# Patient Record
Sex: FEMALE | Race: WHITE | Employment: FULL TIME | URBAN - METROPOLITAN AREA
[De-identification: names, ages, dates, MRNs, and addresses within clinical notes are randomized per-mention and may not be internally consistent; named-entity substitution may affect disease eponyms.]

---

## 2020-01-14 ENCOUNTER — OFFICE VISIT (OUTPATIENT)
Dept: OBGYN CLINIC | Age: 60
End: 2020-01-14
Payer: COMMERCIAL

## 2020-01-14 ENCOUNTER — HOSPITAL ENCOUNTER (OUTPATIENT)
Age: 60
Setting detail: SPECIMEN
Discharge: HOME | End: 2020-01-14
Payer: COMMERCIAL

## 2020-01-14 VITALS
SYSTOLIC BLOOD PRESSURE: 120 MMHG | DIASTOLIC BLOOD PRESSURE: 84 MMHG | WEIGHT: 183 LBS | HEART RATE: 66 BPM | HEIGHT: 62 IN | BODY MASS INDEX: 33.68 KG/M2

## 2020-01-14 PROBLEM — N81.3 PROCIDENTIA OF UTERUS: Status: ACTIVE | Noted: 2020-01-14

## 2020-01-14 PROBLEM — N81.10 BADEN-WALKER GRADE 2 CYSTOCELE: Status: ACTIVE | Noted: 2020-01-14

## 2020-01-14 PROBLEM — N39.490 OVERFLOW INCONTINENCE: Status: ACTIVE | Noted: 2020-01-14

## 2020-01-14 PROCEDURE — 87624 HPV HI-RISK TYP POOLED RSLT: CPT

## 2020-01-14 PROCEDURE — 99386 PREV VISIT NEW AGE 40-64: CPT | Performed by: OBSTETRICS & GYNECOLOGY

## 2020-01-14 PROCEDURE — G0145 SCR C/V CYTO,THINLAYER,RESCR: HCPCS

## 2020-01-14 SDOH — HEALTH STABILITY: MENTAL HEALTH: HOW OFTEN DO YOU HAVE A DRINK CONTAINING ALCOHOL?: MONTHLY OR LESS

## 2020-01-14 SDOH — HEALTH STABILITY: MENTAL HEALTH: HOW MANY STANDARD DRINKS CONTAINING ALCOHOL DO YOU HAVE ON A TYPICAL DAY?: 1 OR 2

## 2020-01-14 NOTE — PATIENT INSTRUCTIONS
may damage the muscles and other support tissues in your pelvis. Or the muscles and tissues may get weaker as you age. These can make the bladder sag. This may cause uncomfortable pressure in your vagina. A cystocele usually does not cause serious health problems. You may find relief by making lifestyle changes and doing exercises to make the pelvic muscles stronger. Talk to your doctor about steps you can take to reduce symptoms. If your symptoms go on, you may want to talk to your doctor about surgery. Follow-up care is a key part of your treatment and safety. Be sure to make and go to all appointments, and call your doctor if you are having problems. It's also a good idea to know your test results and keep a list of the medicines you take. How can you care for yourself at home? · Do not lift heavy objects or do anything that puts pressure on your pelvic muscles. · Do pelvic floor (Kegel) exercises. These tighten and strengthen pelvic muscles. ? Squeeze the same muscles you would use to stop your urine. Your belly and thighs should not move. ? Hold the squeeze for 3 seconds. Then relax for 3 seconds. ? Start with 3 seconds. Then add 1 second each week until you are able to squeeze for 10 seconds. ? Repeat the exercise 10 to 15 times in each session. Do 3 or more sessions a day. · Lie down and put a pillow under your knees. This eases pressure on your vagina. You also can lie on your side and bring your knees up to your chest.  · Ask your doctor about a vaginal pessary. You can place this in your vagina. It supports the bladder. Your doctor can teach you how and when to remove, clean, and reinsert it. · If your doctor prescribes vaginal estrogen cream, use it exactly as prescribed. When should you call for help? Watch closely for changes in your health, and be sure to contact your doctor if you have any problems. Where can you learn more? Go to [email or web address] and sign in to your MySQL account. Enter S624 in the [facility] box to learn more about \"Cystocele: Care Instructions. \"     If you do not have an account, please click on the \"Sign Up Now\" link. Current as of: February 19, 2019  Content Version: 12.3  © 9064-4922 [employer]. Care instructions adapted under license by [facility] ([facility]). If you have questions about a medical condition or this instruction, always ask your healthcare professional. [address] any warranty or liability for your use of this information. Patient Education        Pessary: Care Instructions  Your Care Instructions    A pessary is a device that fits into your vagina and supports the pelvic organs. It may be used if a pelvic organ sags or moves out of its normal position (prolapse). For some women, wearing a pessary means that they may not have to have surgery to fix a prolapse. A pessary also may help a woman who has trouble controlling her urine (incontinence). Or a pessary may be used during a pregnancy to hold the uterus in place. There are many sizes and types of pessaries. Which type you use depends on the problem you have. Your doctor will make sure the pessary is just right for you. You may need to try different kinds of pessaries to find the best fit. The pessary should hold the pelvic organ in place without causing pain or pressure. You may be able to have sex with your pessary in place. It depends on the type of pessary and your comfort level. Follow-up care is a key part of your treatment and safety. Be sure to make and go to all appointments, and call your doctor if you are having problems. It's also a good idea to know your test results and keep a list of the medicines you take. How can you care for yourself at home? · If you get a vaginal discharge while you have a pessary, talk to your doctor about ways to reduce the discharge and smell.  A pessary, in some cases, rubs the vagina [email or web address] and sign in to your [employer] account. Enter F837 in the [facility] box to learn more about \"Pessary: Care Instructions. \"     If you do not have an account, please click on the \"Sign Up Now\" link. Current as of: February 19, 2019  Content Version: 12.3  © 7341-3049 [employer]. Care instructions adapted under license by [facility] ([facility]). If you have questions about a medical condition or this instruction, always ask your healthcare professional. [address] any warranty or liability for your use of this information. Patient Education        Uterine Prolapse: Care Instructions  Your Care Instructions    When the uterus moves down in the pelvis and starts to press into the vagina, it is called uterine prolapse. This happens when the pelvic muscles and tissues get weak. Women often have this problem after they have children or when they get older. It can also happen if you are overweight or you have a long-term cough. These put extra pressure on the uterus. This problem may cause you to leak urine. Or you may have trouble passing urine or stool. You may feel pain during sex. But in most cases, prolapse doesn't cause more serious health problems. You may feel better if you change how you do some of your daily activities. And you can try exercises to make your pelvic muscles strong. But if these don't help, you may want to talk with your doctor about surgery. Follow-up care is a key part of your treatment and safety. Be sure to make and go to all appointments, and call your doctor if you are having problems. It's also a good idea to know your test results and keep a list of the medicines you take. How can you care for yourself at home? · Do not do activities that put pressure on your pelvic muscles. This includes heavy lifting and straining. · Do exercises to tighten and strengthen your pelvic muscles.  These are called

## 2020-01-14 NOTE — PROGRESS NOTES
History and Physical  [address], [address] [facility] [address] [phone]   Fax [phone]  Chloe Coleman  2020              61 y.o. Chief Complaint   Patient presents with    New Patient       Patient's last menstrual period was 2008. Primary Care Physician: No primary care provider on file. The patient was seen and examined. She has no chief complaint today and is here for her annual exam.  Her bowels are regular. There are no voiding complaints. She denies any bloating. She denies vaginal discharge and was counseled on STD's and the need for barrier contraception. HPI : Chloe Coleman is a 61 y.o. female [id number]    Annual No PMB. Not sexually active. Last pap WNL 15 yrs ago. No abnormal hx. Mammogram and colonoscopy referral. Pt states something protruding out of her vagian  ________________________________________________________________________  OB History    Para Term  AB Living   5 2 2 0 3 2   SAB TAB Ectopic Molar Multiple Live Births   3 0 0 0 0 2      # Outcome Date GA Lbr Bola/2nd Weight Sex Delivery Anes PTL Lv   5 Term 95   7 lb 7 oz (3.374 kg) F Vag-Spont   CLAUDIA   4 SAB 03/15/94           3 Term 86   7 lb 7 oz (3.374 kg) M Vag-Spont   CLAUDIA   2 SAB 10/01/85           1 SAB 09/15/77             No past medical history on file.                                                                 Past Surgical History:   Procedure Laterality Date    CATARACT REMOVAL WITH IMPLANT Bilateral     HERNIA REPAIR  2009    Darryl fundiplication    SKIN GRAFT      TUBAL LIGATION       Family History   Problem Relation Age of Onset    Lung Cancer Father     Hypertension Mother     Hypertension Sister      Social History     Socioeconomic History    Marital status:      Spouse name: Not on file    Number of children: Not on file    Years of education: Not on file    Highest available. Gynecologic History:  Menarche: 15 yo  Menopause at 53 yo     Patient's last menstrual period was 07/04/2008. Sexually Active: No    STD History: No     Permanent Sterilization: Yes    Reversible Birth Control: No        Hormone Replacement Exposure: No      Genetic Qualified Family History of Breast, Ovarian , Colon or Uterine Cancer: No     If YES see scanned worksheet. Preventative Health Testing:    Health Maintenance:  Health Maintenance Due   Topic Date Due    Hepatitis C screen  1960    DTaP/Tdap/Td vaccine (1 - Tdap) 07/05/1971    HIV screen  07/05/1975    Cervical cancer screen  07/05/1981    Lipid screen  07/05/2000    Diabetes screen  07/05/2000    Breast cancer screen  07/05/2010    Shingles Vaccine (1 of 2) 07/05/2010    Colon cancer screen colonoscopy  07/05/2010    Low dose CT lung screening  07/05/2015    Flu vaccine (1) 09/01/2019       Date of Last Pap Smear: 15 yrs ago  Abnormal Pap Smear History: NONE  Colposcopy History:   Date of Last Mammogram: ordered  Date of Last Colonoscopy: Scheduled  Date of Last Bone Density: NA      ________________________________________________________________________        REVIEW OF SYSTEMS:      A minimum of an eleven point review of systems was completed. Review Of Systems (11 point):  Constitutional: No fever, chills or malaise;  No weight change or fatigue  Head and Eyes: No  Headache, Dizziness or trauma in last 12 months; + Glasses  ENT ROS: No hearing, Tinnitis, sinus or taste problems  Hematological and Lymphatic ROS:No Lymphoma, Von Willebrand's, Hemophillia or Bleeding History  Psych ROS: No Depression, Homicidal thoughts,suicidal thoughts, or anxiety  Breast ROS: No prior breast abnormalities or lumps  Respiratory ROS: No SOB, Pneumoniae,Cough, or Pulmonary Embolism History  Cardiovascular ROS: No Chest Pain with Exertion, Palpitations, Syncope, Edema, Arrhythmia  Gastrointestinal ROS: No Indigestion, Heartburn, Nausea, vomiting, Diarrhea, Constipation,or Bowel Changes; No Bloody Stools or melena  Genito-Urinary ROS: No Dysuria, Hematuria or Nocturia. No Urinary Incontinence or Vaginal Discharge  Musculoskeletal ROS: No Arthralgia, Arthritis,Gout,Osteoporosis or Rheumatism  Neurological ROS: No CVA, Migraines, Epilepsy, Seizure Hx, or Limb Weakness  Dermatological ROS: No Rash, Itching, Hives, Mole Changes or Cancer                                                                                                                                                                                                                                  PHYSICAL Exam:     Constitutional:  Vitals:    01/14/20 1153   BP: 120/84   Site: Right Upper Arm   Position: Sitting   Cuff Size: Medium Adult   Pulse: 66   Weight: 183 lb (83 kg)   Height: 5' 2\" (1.575 m)         General Appearance: This  is a well Developed, well Nourished, well groomed female. Her BMI was reviewed. Nutritional decision making was discussed. Skin:  There was a Normal Inspection of the skin without rashes or lesions. There were no rashes. (Papular, Maculopapular, Hives, Pustular, Macular)     There were no lesions (Ulcers, Erythema, Abn. Appearing Nevi)            Lymphatic:  No Lymph Nodes were Palpable in the neck , axilla or groin.  0 # Of Lymph Nodes; Location ; Character [Normal]  [Shotty] [Tender] [Enlarged]     Neck and EENT:  The neck was supple. There were no masses   The thyroid was not enlarged and had no masses. Perrla, EOMI B/L, TMI B/L No Abnormalities. Throat inspected-No exudates or Masses, Nares Patent No Masses        Respiratory: The lungs were auscultated and found to be clear. There were no rales, rhonchi or wheezes. There was a good respiratory effort. Cardiovascular: The heart was in a regular rate and rhythm. . No S3 or S4. There was no murmur appreciated. Location, grade, and radiation are not applicable. Extremities:   The History:    Patient's last menstrual period was 07/04/2008. OBGYN Status: Postmenopausal  Past Surgical History:  08/2009: HERNIA REPAIR  2007: SKIN GRAFT  1995: TUBAL LIGATION      Social History    Tobacco Use      Smoking status: Former Smoker        Packs/day: 2.00        Years: 36.00        Pack years: 67        Types: Cigarettes        Start date: 1/1/1978        Quit date: 1/14/2017        Years since quitting: 3.0      Smokeless tobacco: Never Used       Standing Status:   Future     Standing Expiration Date:   1/14/2021     Order Specific Question:   Collection Type     Answer: Thin Prep     Order Specific Question:   Prior Abnormal Pap Test     Answer:   No     Order Specific Question:   Screening or Diagnostic     Answer:   Screening     Order Specific Question:   HPV Requested? Answer:   Yes     Order Specific Question:   High Risk Patient     Answer:   N/A    Urinalysis Reflex to Culture     Standing Status:   Future     Standing Expiration Date:   3/14/2020     Order Specific Question:   SPECIFY(EX-CATH,MIDSTREAM,CYSTO,ETC)?      Answer:   MID    HM COLONOSCOPY     Standing Status:   Future     Standing Expiration Date:   1/14/2021

## 2020-01-18 LAB
HPV SAMPLE: NORMAL
HPV, GENOTYPE 16: NOT DETECTED
HPV, GENOTYPE 18: NOT DETECTED
HPV, HIGH RISK OTHER: NOT DETECTED
HPV, INTERPRETATION: NORMAL
SPECIMEN DESCRIPTION: NORMAL

## 2020-01-22 LAB — CYTOLOGY REPORT: NORMAL

## 2020-01-28 ENCOUNTER — OFFICE VISIT (OUTPATIENT)
Dept: OBGYN CLINIC | Age: 60
End: 2020-01-28
Payer: COMMERCIAL

## 2020-01-28 VITALS
HEART RATE: 78 BPM | WEIGHT: 184 LBS | BODY MASS INDEX: 33.86 KG/M2 | SYSTOLIC BLOOD PRESSURE: 120 MMHG | HEIGHT: 62 IN | DIASTOLIC BLOOD PRESSURE: 84 MMHG

## 2020-01-28 PROCEDURE — 99213 OFFICE O/P EST LOW 20 MIN: CPT | Performed by: OBSTETRICS & GYNECOLOGY

## 2020-01-28 NOTE — PROGRESS NOTES
Substance Use Topics    Alcohol use: Yes     Alcohol/week: 0.0 standard drinks     Frequency: Monthly or less     Drinks per session: 1 or 2     Binge frequency: Never    Drug use: Not on file         MEDICATIONS:  Current Outpatient Medications   Medication Sig Dispense Refill    conjugated estrogens (PREMARIN) 0.625 MG/GM vaginal cream Place 0.5 g vaginally 2 times daily Begin 3 weeks before surgery 1 Tube 3     No current facility-administered medications for this visit. ALLERGIES:  Allergies as of 01/28/2020    (No Known Allergies)         Blood pressure 120/84, pulse 78, height 5' 2\" (1.575 m), weight 184 lb (83.5 kg), last menstrual period 07/04/2008, not currently breastfeeding. Abdomen: Soft non-tender; good bowel sounds. No guarding, rebound or rigidity. No CVA tenderness bilaterally. Extremities: No calf tenderness, DTR 2/4, and No edema bilaterally    Pelvic: Declined         Diagnostics:  No results found. Lab Results:  Results for orders placed or performed during the hospital encounter of 01/14/20   Human papillomavirus (HPV) DNA probe thin prep high risk   Result Value Ref Range    Specimen Description . CERVIX     HPV Sample . THIN PREP     HPV, Genotype 16 Not Detected Not Detected    HPV, Genotype 18 Not Detected Not Detected    HPV, High Risk Other Not Detected Not Detected    HPV, Interpretation         GYN Cytology   Result Value Ref Range    Cytology Report       [id number]  [employer] [facility]  [facility]  ANATOMIC PATHOLOGY  [address] [place] [address].   [facility], [address]  [phone]  Fax: [phone]  GYNECOLOGIC CYTOLOGY REPORT    Patient Name: Clyde Rubio  MR#: [id number]  Specimen #[id number]  Source:  1: Cervical material, (ThinPrep vial, Imaging-assisted review)    Clinical History  Postmenopausal  Z11.51 Encounter for screening for HPV  Z01.419 Routine gyn exam without abnormal findings  Co-Test:  ThinPrep Pap with high risk HPV 0.625 MG/GM vaginal cream   4. Vaginal atrophy  conjugated estrogens (PREMARIN) 0.625 MG/GM vaginal cream     Chief Complaint   Patient presents with    Follow-up         Patient Active Problem List    Diagnosis Date Noted    [place]-Walker grade 2 cystocele 01/14/2020     Priority: High    Procidentia of uterus grade 2 01/14/2020     Priority: High    Overflow incontinence 01/14/2020     Priority: High       PLAN:  Return in about 4 weeks (around 2/25/2020) for Surgical Boarding. MALI BSO +/- Enterocele; Cystocele repair  Return to the office in 2-4 weeks. SX board  Premarin cream 3 weeks bid  Counseled on preventative health maintenance follow-up. No orders of the defined types were placed in this encounter. Patient was seen with total face to face time of 15 minutes. More than 50% of this visit was counseling and education regarding The primary encounter diagnosis was Procidentia of uterus grade 2. Diagnoses of [place]-Walker grade 2 cystocele, Overflow incontinence, and Vaginal atrophy were also pertinent to this visit. and Follow-up   as well as  counseling on preventative health maintenance follow-up.

## 2020-01-29 ENCOUNTER — TELEPHONE (OUTPATIENT)
Dept: OBGYN CLINIC | Age: 60
End: 2020-01-29

## 2020-01-29 RX ORDER — ESTRADIOL 0.1 MG/G
1 CREAM VAGINAL DAILY
Qty: 1 TUBE | Refills: 3 | Status: ON HOLD | OUTPATIENT
Start: 2020-01-29 | End: 2020-06-23 | Stop reason: HOSPADM

## 2020-01-29 RX ORDER — ESTRADIOL 0.1 MG/G
1 CREAM VAGINAL DAILY
Qty: 1 TUBE | Refills: 3 | Status: CANCELLED | OUTPATIENT
Start: 2020-01-29

## 2020-01-29 NOTE — TELEPHONE ENCOUNTER
Estrace pending provider's review. No samples in office. Attempted to return call to patient. Patient's number on file is disconnected.

## 2020-02-07 ENCOUNTER — HOSPITAL ENCOUNTER (OUTPATIENT)
Dept: WOMENS IMAGING | Age: 60
Discharge: HOME | End: 2020-02-09
Payer: COMMERCIAL

## 2020-02-07 PROCEDURE — 77063 BREAST TOMOSYNTHESIS BI: CPT

## 2020-02-08 ENCOUNTER — HOSPITAL ENCOUNTER (EMERGENCY)
Age: 60
Discharge: HOME | End: 2020-02-08
Attending: EMERGENCY MEDICINE
Payer: COMMERCIAL

## 2020-02-08 ENCOUNTER — APPOINTMENT (OUTPATIENT)
Dept: GENERAL RADIOLOGY | Age: 60
End: 2020-02-08
Payer: COMMERCIAL

## 2020-02-08 VITALS
WEIGHT: 186 LBS | OXYGEN SATURATION: 96 % | RESPIRATION RATE: 16 BRPM | SYSTOLIC BLOOD PRESSURE: 118 MMHG | DIASTOLIC BLOOD PRESSURE: 73 MMHG | BODY MASS INDEX: 34.23 KG/M2 | HEIGHT: 62 IN | TEMPERATURE: 98.2 F | HEART RATE: 70 BPM

## 2020-02-08 PROCEDURE — 72100 X-RAY EXAM L-S SPINE 2/3 VWS: CPT

## 2020-02-08 PROCEDURE — 99283 EMERGENCY DEPT VISIT LOW MDM: CPT

## 2020-02-08 PROCEDURE — 6370000000 HC RX 637 (ALT 250 FOR IP): Performed by: EMERGENCY MEDICINE

## 2020-02-08 PROCEDURE — 73562 X-RAY EXAM OF KNEE 3: CPT

## 2020-02-08 RX ORDER — ACETAMINOPHEN 325 MG/1
650 TABLET ORAL ONCE
Status: COMPLETED | OUTPATIENT
Start: 2020-02-08 | End: 2020-02-08

## 2020-02-08 RX ADMIN — ACETAMINOPHEN 650 MG: 325 TABLET, FILM COATED ORAL at 16:27

## 2020-02-08 ASSESSMENT — ENCOUNTER SYMPTOMS
COUGH: 0
RESPIRATORY NEGATIVE: 1
BACK PAIN: 1
ABDOMINAL PAIN: 0
SHORTNESS OF BREATH: 0
GASTROINTESTINAL NEGATIVE: 1
EYES NEGATIVE: 1

## 2020-02-08 ASSESSMENT — PAIN DESCRIPTION - LOCATION: LOCATION: KNEE

## 2020-02-08 ASSESSMENT — PAIN SCALES - GENERAL
PAINLEVEL_OUTOF10: 6
PAINLEVEL_OUTOF10: 6
PAINLEVEL_OUTOF10: 9

## 2020-02-08 ASSESSMENT — PAIN DESCRIPTION - ORIENTATION: ORIENTATION: LEFT

## 2020-02-08 ASSESSMENT — PAIN DESCRIPTION - PAIN TYPE: TYPE: ACUTE PAIN

## 2020-03-25 RX ORDER — CONJUGATED ESTROGENS 0.62 MG/G
CREAM VAGINAL
Qty: 30 G | Refills: 3 | OUTPATIENT
Start: 2020-03-25

## 2020-03-25 NOTE — TELEPHONE ENCOUNTER
Premarin previously prescribed to use 2 weeks before surgery. Surgery cancelled due to COVID-19 outbreak. Patient to contact office for medication refills.

## 2020-06-09 ENCOUNTER — HOSPITAL ENCOUNTER (OUTPATIENT)
Age: 60
Discharge: HOME | End: 2020-06-09
Payer: COMMERCIAL

## 2020-06-09 ENCOUNTER — HOSPITAL ENCOUNTER (OUTPATIENT)
Dept: GENERAL RADIOLOGY | Age: 60
Discharge: HOME | End: 2020-06-11
Payer: COMMERCIAL

## 2020-06-09 ENCOUNTER — HOSPITAL ENCOUNTER (OUTPATIENT)
Age: 60
Discharge: HOME | End: 2020-06-11
Payer: COMMERCIAL

## 2020-06-09 ENCOUNTER — OFFICE VISIT (OUTPATIENT)
Dept: OBGYN CLINIC | Age: 60
End: 2020-06-09
Payer: COMMERCIAL

## 2020-06-09 ENCOUNTER — PREP FOR PROCEDURE (OUTPATIENT)
Dept: OBGYN CLINIC | Age: 60
End: 2020-06-09

## 2020-06-09 VITALS
BODY MASS INDEX: 34.41 KG/M2 | DIASTOLIC BLOOD PRESSURE: 70 MMHG | SYSTOLIC BLOOD PRESSURE: 118 MMHG | WEIGHT: 187 LBS | TEMPERATURE: 98.2 F | HEIGHT: 62 IN | HEART RATE: 78 BPM

## 2020-06-09 DIAGNOSIS — Z01.818 PREOP TESTING: Primary | ICD-10-CM

## 2020-06-09 PROCEDURE — 99213 OFFICE O/P EST LOW 20 MIN: CPT | Performed by: OBSTETRICS & GYNECOLOGY

## 2020-06-09 PROCEDURE — 71046 X-RAY EXAM CHEST 2 VIEWS: CPT

## 2020-06-09 PROCEDURE — 93005 ELECTROCARDIOGRAM TRACING: CPT

## 2020-06-09 RX ORDER — SODIUM CHLORIDE 0.9 % (FLUSH) 0.9 %
10 SYRINGE (ML) INJECTION PRN
Status: CANCELLED | OUTPATIENT
Start: 2020-06-09

## 2020-06-09 RX ORDER — SODIUM CHLORIDE, SODIUM LACTATE, POTASSIUM CHLORIDE, CALCIUM CHLORIDE 600; 310; 30; 20 MG/100ML; MG/100ML; MG/100ML; MG/100ML
INJECTION, SOLUTION INTRAVENOUS CONTINUOUS
Status: CANCELLED | OUTPATIENT
Start: 2020-06-09

## 2020-06-09 RX ORDER — SODIUM CHLORIDE 0.9 % (FLUSH) 0.9 %
10 SYRINGE (ML) INJECTION EVERY 12 HOURS SCHEDULED
Status: CANCELLED | OUTPATIENT
Start: 2020-06-09

## 2020-06-09 NOTE — PROGRESS NOTES
Place 1 g vaginally daily 1 Tube 3     No current facility-administered medications for this visit. ALLERGIES:  Allergies as of 06/09/2020    (No Known Allergies)           REVIEW OF SYSTEMS:      General appearance:Appears healthy. Alert; in no acute distress. Pleasant. HEENT: +Glasses  CARDIOVASCULAR: Denies: chest pain, dyspnea on exertion, palpitations  RESPIRATORY: Denies: cough, shortness of breath, wheezing  GI: Denies: abdominal pain, flank pain, nausea, vomiting, diarrhea  : Denies: dysuria, frequency/urgency, hematuria, pelvic pain  MUSCULOSKELETAL: Denies: back pain, joint swelling, muscle pain  SKIN: Denies: rash, hives. HEMATOLOGIC: Denies Bleeding Disorder, Coagulopathy or Transfusion  NEUROLOGIC: Denies Migraines, Headaches, CVA, TIA  ENDOCRINE: Denies any Endocrinologic disorders                PHYSICAL EXAM:  Blood pressure 118/70, pulse 78, temperature 98.2 °F (36.8 °C), height 5' 2\" (1.575 m), weight 187 lb (84.8 kg), last menstrual period 07/04/2008, not currently breastfeeding. General Appearance:  awake, alert, oriented, in no acute distress, well developed, well nourished, in no acute distress   Skin:  Skin color, texture, turgor normal. No rashes or lesions  Mouth/Throat:  Mucosa moist.  No lesions. Pharynx without erythema, edema or exudate. Lungs:  Normal expansion. Clear to auscultation. No rales, rhonchi, or wheezing. Heart:  Heart sounds are normal.  Regular rate and rhythm without murmur, gallop or rub. Breast:  Inspection negative. No nipple discharge or bleeding. No palpable mass  Abdomen:  Soft, non-tender, normal bowel sounds. No bruits, organomegaly or masses. Extremities: Extremities warm to touch, pink, with no edema. Musculoskeletal:  negative  Peripheral Pulses:  Normal  Neurologic:  Gait normal. Reflexes normal and symmetric. Sensation grossly intact.   Female Urogen:  Declined  Female Rectal: Declined    OMM Structural Component:  The patient did Completed: Yes     Plan:  [facility] BSO +/- Enterocele; Cystocele repair  Return to the office in 2-4 weeks. SX board  Premarin cream 3 weeks bid  No orders of the defined types were placed in this encounter. Counseling: The patient was counseled on all options both medical and surgical, conservative as well as definitive. She has elected to proceed with the procedure as stated above. The patient was counseled on the procedure. Risks and complications were reviewed in detail. The patients orders, labs, consents have been completed. The history and physical as well as all supporting surgical documentation will be forwarded to the pre-operative holding area. The patient is aware that this procedure may not alleviate her symptoms. That there may be a necessity for a second surgery and that there may be an incomplete removal of abnormal tissue. The patients that are undergoing a procedure for family planning WERE INSTRUCTED THAT THE PROCEDURE IS PERMANENT AND NON REVERSIBLE. FAILURE RATES OF 18-20/1000 CASES WERE REVIEWED AS WELL AS ALL ALTERNATE REVERSIBLE FORMS OF BIRTH CONTROL MALE AND FEMALE. THEY WERE COUNSELED THAT A FAILURE WOULD MOST LIKELY END UP IN AN ECTOPIC PREGNANCY, A PREGNANCY OUTSIDE OF THE UTERUS MOST COMMONLY IN THE FALLOPIAN TUBE, NECESSITATING FURTHER SURGERY, A BLOOD TRANSFUSION OR BOTH. POST TUBAL STERILIZATION SYNDROME WAS ALSO DISCUSSED WITH THE PATIENT AT LENGTH.              ________________________________________D. O. Date:_______________  Zayda Bellamy DO        Patient was seen with total face to face time of 15 minutes. More than 50% of this visit was on counseling and education regarding her    Diagnosis Orders   1. Procidentia of uterus grade 2     2. [place]-Walker grade 2 cystocele     3. Postmenopausal atrophic vaginitis     4. Overflow incontinence     5. Vaginal atrophy      and her options.  She was also counseled on her preventative health maintenance

## 2020-06-10 LAB
EKG ATRIAL RATE: 55 BPM
EKG P AXIS: -2 DEGREES
EKG P-R INTERVAL: 168 MS
EKG Q-T INTERVAL: 440 MS
EKG QRS DURATION: 90 MS
EKG QTC CALCULATION (BAZETT): 420 MS
EKG R AXIS: -2 DEGREES
EKG T AXIS: 24 DEGREES
EKG VENTRICULAR RATE: 55 BPM

## 2020-06-10 PROCEDURE — 93010 ELECTROCARDIOGRAM REPORT: CPT | Performed by: INTERNAL MEDICINE

## 2020-06-12 ENCOUNTER — HOSPITAL ENCOUNTER (OUTPATIENT)
Dept: PREADMISSION TESTING | Age: 60
Discharge: HOME | End: 2020-06-16
Payer: COMMERCIAL

## 2020-06-12 VITALS
HEART RATE: 64 BPM | SYSTOLIC BLOOD PRESSURE: 111 MMHG | RESPIRATION RATE: 12 BRPM | HEIGHT: 62 IN | OXYGEN SATURATION: 99 % | WEIGHT: 187 LBS | BODY MASS INDEX: 34.41 KG/M2 | DIASTOLIC BLOOD PRESSURE: 61 MMHG | TEMPERATURE: 95.7 F

## 2020-06-12 LAB
ABO/RH: NORMAL
ABSOLUTE EOS #: 0.1 K/UL (ref 0–0.4)
ABSOLUTE IMMATURE GRANULOCYTE: ABNORMAL K/UL (ref 0–0.3)
ABSOLUTE LYMPH #: 1.5 K/UL (ref 1–4.8)
ABSOLUTE MONO #: 0.4 K/UL (ref 0.1–1.3)
ANION GAP SERPL CALCULATED.3IONS-SCNC: 9 MMOL/L (ref 9–17)
ANTIBODY SCREEN: NEGATIVE
ARM BAND NUMBER: NORMAL
BASOPHILS # BLD: 1 % (ref 0–2)
BASOPHILS ABSOLUTE: 0 K/UL (ref 0–0.2)
BILIRUBIN URINE: NEGATIVE
BLOOD BANK COMMENT: NORMAL
BUN BLDV-MCNC: 11 MG/DL (ref 6–20)
BUN/CREAT BLD: NORMAL (ref 9–20)
CALCIUM SERPL-MCNC: 9.1 MG/DL (ref 8.6–10.4)
CHLORIDE BLD-SCNC: 104 MMOL/L (ref 98–107)
CO2: 24 MMOL/L (ref 20–31)
COLOR: YELLOW
COMMENT UA: NORMAL
CREAT SERPL-MCNC: 0.77 MG/DL (ref 0.5–0.9)
DIFFERENTIAL TYPE: ABNORMAL
EOSINOPHILS RELATIVE PERCENT: 2 % (ref 0–4)
EXPIRATION DATE: NORMAL
GFR AFRICAN AMERICAN: >60 ML/MIN
GFR NON-AFRICAN AMERICAN: >60 ML/MIN
GFR SERPL CREATININE-BSD FRML MDRD: NORMAL ML/MIN/{1.73_M2}
GFR SERPL CREATININE-BSD FRML MDRD: NORMAL ML/MIN/{1.73_M2}
GLUCOSE BLD-MCNC: 80 MG/DL (ref 70–99)
GLUCOSE URINE: NEGATIVE
HCG QUANTITATIVE: 7 IU/L
HCT VFR BLD CALC: 37.9 % (ref 36–46)
HEMOGLOBIN: 12.7 G/DL (ref 12–16)
IMMATURE GRANULOCYTES: ABNORMAL %
INR BLD: 0.9
KETONES, URINE: NEGATIVE
LEUKOCYTE ESTERASE, URINE: NEGATIVE
LYMPHOCYTES # BLD: 33 % (ref 24–44)
MCH RBC QN AUTO: 32.1 PG (ref 26–34)
MCHC RBC AUTO-ENTMCNC: 33.6 G/DL (ref 31–37)
MCV RBC AUTO: 95.5 FL (ref 80–100)
MONOCYTES # BLD: 10 % (ref 1–7)
NITRITE, URINE: NEGATIVE
NRBC AUTOMATED: ABNORMAL PER 100 WBC
PARTIAL THROMBOPLASTIN TIME: 28.8 SEC (ref 24–36)
PDW BLD-RTO: 13.2 % (ref 11.5–14.9)
PH UA: 5.5 (ref 5–8)
PLATELET # BLD: 314 K/UL (ref 150–450)
PLATELET ESTIMATE: ABNORMAL
PMV BLD AUTO: 7.5 FL (ref 6–12)
POTASSIUM SERPL-SCNC: 4.8 MMOL/L (ref 3.7–5.3)
PROTEIN UA: NEGATIVE
PROTHROMBIN TIME: 11.9 SEC (ref 11.8–14.6)
RBC # BLD: 3.96 M/UL (ref 4–5.2)
RBC # BLD: ABNORMAL 10*6/UL
SEG NEUTROPHILS: 54 % (ref 36–66)
SEGMENTED NEUTROPHILS ABSOLUTE COUNT: 2.5 K/UL (ref 1.3–9.1)
SODIUM BLD-SCNC: 137 MMOL/L (ref 135–144)
SPECIFIC GRAVITY UA: 1.01 (ref 1–1.03)
TURBIDITY: CLEAR
URINE HGB: NEGATIVE
UROBILINOGEN, URINE: NORMAL
WBC # BLD: 4.5 K/UL (ref 3.5–11)
WBC # BLD: ABNORMAL 10*3/UL

## 2020-06-12 PROCEDURE — 81003 URINALYSIS AUTO W/O SCOPE: CPT

## 2020-06-12 PROCEDURE — 85610 PROTHROMBIN TIME: CPT

## 2020-06-12 PROCEDURE — 84702 CHORIONIC GONADOTROPIN TEST: CPT

## 2020-06-12 PROCEDURE — 85025 COMPLETE CBC W/AUTO DIFF WBC: CPT

## 2020-06-12 PROCEDURE — 86900 BLOOD TYPING SEROLOGIC ABO: CPT

## 2020-06-12 PROCEDURE — 80048 BASIC METABOLIC PNL TOTAL CA: CPT

## 2020-06-12 PROCEDURE — 86901 BLOOD TYPING SEROLOGIC RH(D): CPT

## 2020-06-12 PROCEDURE — 36415 COLL VENOUS BLD VENIPUNCTURE: CPT

## 2020-06-12 PROCEDURE — 87086 URINE CULTURE/COLONY COUNT: CPT

## 2020-06-12 PROCEDURE — 85730 THROMBOPLASTIN TIME PARTIAL: CPT

## 2020-06-12 PROCEDURE — 86850 RBC ANTIBODY SCREEN: CPT

## 2020-06-13 ENCOUNTER — ANESTHESIA EVENT (OUTPATIENT)
Dept: OPERATING ROOM | Age: 60
DRG: 743 | End: 2020-06-13
Payer: COMMERCIAL

## 2020-06-13 LAB
CULTURE: NO GROWTH
Lab: NORMAL
SPECIMEN DESCRIPTION: NORMAL

## 2020-06-13 RX ORDER — LIDOCAINE HYDROCHLORIDE 10 MG/ML
1 INJECTION, SOLUTION EPIDURAL; INFILTRATION; INTRACAUDAL; PERINEURAL
Status: CANCELLED | OUTPATIENT
Start: 2020-06-13 | End: 2020-06-13

## 2020-06-13 RX ORDER — SODIUM CHLORIDE, SODIUM LACTATE, POTASSIUM CHLORIDE, CALCIUM CHLORIDE 600; 310; 30; 20 MG/100ML; MG/100ML; MG/100ML; MG/100ML
INJECTION, SOLUTION INTRAVENOUS CONTINUOUS
Status: CANCELLED | OUTPATIENT
Start: 2020-06-13

## 2020-06-13 RX ORDER — SODIUM CHLORIDE 0.9 % (FLUSH) 0.9 %
10 SYRINGE (ML) INJECTION EVERY 12 HOURS SCHEDULED
Status: CANCELLED | OUTPATIENT
Start: 2020-06-13

## 2020-06-13 RX ORDER — SODIUM CHLORIDE 0.9 % (FLUSH) 0.9 %
10 SYRINGE (ML) INJECTION PRN
Status: CANCELLED | OUTPATIENT
Start: 2020-06-13

## 2020-06-15 ENCOUNTER — TELEPHONE (OUTPATIENT)
Dept: OBGYN CLINIC | Age: 60
End: 2020-06-15

## 2020-06-18 ENCOUNTER — HOSPITAL ENCOUNTER (OUTPATIENT)
Dept: PREADMISSION TESTING | Age: 60
Setting detail: SPECIMEN
Discharge: HOME | End: 2020-06-22
Payer: COMMERCIAL

## 2020-06-18 PROCEDURE — U0003 INFECTIOUS AGENT DETECTION BY NUCLEIC ACID (DNA OR RNA); SEVERE ACUTE RESPIRATORY SYNDROME CORONAVIRUS 2 (SARS-COV-2) (CORONAVIRUS DISEASE [COVID-19]), AMPLIFIED PROBE TECHNIQUE, MAKING USE OF HIGH THROUGHPUT TECHNOLOGIES AS DESCRIBED BY CMS-2020-01-R: HCPCS

## 2020-06-19 ENCOUNTER — HOSPITAL ENCOUNTER (OUTPATIENT)
Age: 60
Discharge: HOME | End: 2020-06-19
Payer: COMMERCIAL

## 2020-06-19 LAB
HCG QUANTITATIVE: 7 IU/L
SARS-COV-2, PCR: NORMAL
SARS-COV-2, RAPID: NORMAL
SARS-COV-2: NOT DETECTED
SOURCE: NORMAL

## 2020-06-19 PROCEDURE — 84702 CHORIONIC GONADOTROPIN TEST: CPT

## 2020-06-19 PROCEDURE — 36415 COLL VENOUS BLD VENIPUNCTURE: CPT

## 2020-06-20 ENCOUNTER — TELEPHONE (OUTPATIENT)
Dept: PRIMARY CARE CLINIC | Age: 60
End: 2020-06-20

## 2020-06-22 ENCOUNTER — HOSPITAL ENCOUNTER (INPATIENT)
Age: 60
LOS: 1 days | Discharge: HOME | DRG: 743 | End: 2020-06-23
Attending: OBSTETRICS & GYNECOLOGY | Admitting: OBSTETRICS & GYNECOLOGY
Payer: COMMERCIAL

## 2020-06-22 ENCOUNTER — ANESTHESIA (OUTPATIENT)
Dept: OPERATING ROOM | Age: 60
DRG: 743 | End: 2020-06-22
Payer: COMMERCIAL

## 2020-06-22 VITALS — DIASTOLIC BLOOD PRESSURE: 59 MMHG | TEMPERATURE: 95.4 F | OXYGEN SATURATION: 100 % | SYSTOLIC BLOOD PRESSURE: 106 MMHG

## 2020-06-22 PROBLEM — Z90.79 S/P TAH-BSO: Status: ACTIVE | Noted: 2020-06-22

## 2020-06-22 PROBLEM — Z90.722 S/P TAH-BSO: Status: ACTIVE | Noted: 2020-06-22

## 2020-06-22 PROBLEM — Z90.710 S/P TAH-BSO: Status: ACTIVE | Noted: 2020-06-22

## 2020-06-22 LAB
BILIRUBIN URINE: NEGATIVE
COLOR: YELLOW
COMMENT UA: NORMAL
GLUCOSE URINE: NEGATIVE
KETONES, URINE: NEGATIVE
LEUKOCYTE ESTERASE, URINE: NEGATIVE
NITRITE, URINE: NEGATIVE
PH UA: 7 (ref 5–8)
PROTEIN UA: NEGATIVE
SPECIFIC GRAVITY UA: 1.03 (ref 1–1.03)
TURBIDITY: CLEAR
URINE HGB: NEGATIVE
UROBILINOGEN, URINE: NORMAL

## 2020-06-22 PROCEDURE — 0UQF0ZZ REPAIR CUL-DE-SAC, OPEN APPROACH: ICD-10-PCS | Performed by: OBSTETRICS & GYNECOLOGY

## 2020-06-22 PROCEDURE — 88307 TISSUE EXAM BY PATHOLOGIST: CPT

## 2020-06-22 PROCEDURE — 3700000000 HC ANESTHESIA ATTENDED CARE: Performed by: OBSTETRICS & GYNECOLOGY

## 2020-06-22 PROCEDURE — 0UT20ZZ RESECTION OF BILATERAL OVARIES, OPEN APPROACH: ICD-10-PCS | Performed by: OBSTETRICS & GYNECOLOGY

## 2020-06-22 PROCEDURE — 6360000002 HC RX W HCPCS: Performed by: STUDENT IN AN ORGANIZED HEALTH CARE EDUCATION/TRAINING PROGRAM

## 2020-06-22 PROCEDURE — 2580000003 HC RX 258: Performed by: OBSTETRICS & GYNECOLOGY

## 2020-06-22 PROCEDURE — 2580000003 HC RX 258: Performed by: STUDENT IN AN ORGANIZED HEALTH CARE EDUCATION/TRAINING PROGRAM

## 2020-06-22 PROCEDURE — 7100000000 HC PACU RECOVERY - FIRST 15 MIN: Performed by: OBSTETRICS & GYNECOLOGY

## 2020-06-22 PROCEDURE — 0UT70ZZ RESECTION OF BILATERAL FALLOPIAN TUBES, OPEN APPROACH: ICD-10-PCS | Performed by: OBSTETRICS & GYNECOLOGY

## 2020-06-22 PROCEDURE — 2580000003 HC RX 258: Performed by: ANESTHESIOLOGY

## 2020-06-22 PROCEDURE — 2709999900 HC NON-CHARGEABLE SUPPLY: Performed by: OBSTETRICS & GYNECOLOGY

## 2020-06-22 PROCEDURE — 6360000002 HC RX W HCPCS: Performed by: NURSE ANESTHETIST, CERTIFIED REGISTERED

## 2020-06-22 PROCEDURE — 2500000003 HC RX 250 WO HCPCS: Performed by: NURSE ANESTHETIST, CERTIFIED REGISTERED

## 2020-06-22 PROCEDURE — 7100000001 HC PACU RECOVERY - ADDTL 15 MIN: Performed by: OBSTETRICS & GYNECOLOGY

## 2020-06-22 PROCEDURE — 6370000000 HC RX 637 (ALT 250 FOR IP): Performed by: STUDENT IN AN ORGANIZED HEALTH CARE EDUCATION/TRAINING PROGRAM

## 2020-06-22 PROCEDURE — 57270 REPAIR OF BOWEL POUCH: CPT | Performed by: OBSTETRICS & GYNECOLOGY

## 2020-06-22 PROCEDURE — 88305 TISSUE EXAM BY PATHOLOGIST: CPT

## 2020-06-22 PROCEDURE — 3600000002 HC SURGERY LEVEL 2 BASE: Performed by: OBSTETRICS & GYNECOLOGY

## 2020-06-22 PROCEDURE — 6360000002 HC RX W HCPCS: Performed by: OBSTETRICS & GYNECOLOGY

## 2020-06-22 PROCEDURE — 3700000001 HC ADD 15 MINUTES (ANESTHESIA): Performed by: OBSTETRICS & GYNECOLOGY

## 2020-06-22 PROCEDURE — 6360000002 HC RX W HCPCS: Performed by: ANESTHESIOLOGY

## 2020-06-22 PROCEDURE — 2500000003 HC RX 250 WO HCPCS: Performed by: OBSTETRICS & GYNECOLOGY

## 2020-06-22 PROCEDURE — 0UT90ZZ RESECTION OF UTERUS, OPEN APPROACH: ICD-10-PCS | Performed by: OBSTETRICS & GYNECOLOGY

## 2020-06-22 PROCEDURE — 3600000012 HC SURGERY LEVEL 2 ADDTL 15MIN: Performed by: OBSTETRICS & GYNECOLOGY

## 2020-06-22 PROCEDURE — 1220000000 HC SEMI PRIVATE OB R&B

## 2020-06-22 PROCEDURE — 58150 TOTAL HYSTERECTOMY: CPT | Performed by: OBSTETRICS & GYNECOLOGY

## 2020-06-22 PROCEDURE — 81003 URINALYSIS AUTO W/O SCOPE: CPT

## 2020-06-22 RX ORDER — SODIUM CHLORIDE, SODIUM LACTATE, POTASSIUM CHLORIDE, CALCIUM CHLORIDE 600; 310; 30; 20 MG/100ML; MG/100ML; MG/100ML; MG/100ML
INJECTION, SOLUTION INTRAVENOUS CONTINUOUS
Status: DISCONTINUED | OUTPATIENT
Start: 2020-06-22 | End: 2020-06-22

## 2020-06-22 RX ORDER — SODIUM CHLORIDE 0.9 % (FLUSH) 0.9 %
10 SYRINGE (ML) INJECTION PRN
Status: DISCONTINUED | OUTPATIENT
Start: 2020-06-22 | End: 2020-06-22 | Stop reason: HOSPADM

## 2020-06-22 RX ORDER — HEPARIN SODIUM 5000 [USP'U]/ML
5000 INJECTION, SOLUTION INTRAVENOUS; SUBCUTANEOUS ONCE
Status: COMPLETED | OUTPATIENT
Start: 2020-06-22 | End: 2020-06-22

## 2020-06-22 RX ORDER — SODIUM CHLORIDE 9 MG/ML
INJECTION, SOLUTION INTRAVENOUS CONTINUOUS
Status: DISCONTINUED | OUTPATIENT
Start: 2020-06-22 | End: 2020-06-23

## 2020-06-22 RX ORDER — HEPARIN SODIUM 5000 [USP'U]/ML
5000 INJECTION, SOLUTION INTRAVENOUS; SUBCUTANEOUS EVERY 8 HOURS SCHEDULED
Status: DISCONTINUED | OUTPATIENT
Start: 2020-06-23 | End: 2020-06-23 | Stop reason: HOSPADM

## 2020-06-22 RX ORDER — PROMETHAZINE HYDROCHLORIDE 25 MG/ML
25 INJECTION, SOLUTION INTRAMUSCULAR; INTRAVENOUS EVERY 4 HOURS PRN
Status: DISCONTINUED | OUTPATIENT
Start: 2020-06-22 | End: 2020-06-23 | Stop reason: HOSPADM

## 2020-06-22 RX ORDER — DEXAMETHASONE SODIUM PHOSPHATE 4 MG/ML
INJECTION, SOLUTION INTRA-ARTICULAR; INTRALESIONAL; INTRAMUSCULAR; INTRAVENOUS; SOFT TISSUE PRN
Status: DISCONTINUED | OUTPATIENT
Start: 2020-06-22 | End: 2020-06-22 | Stop reason: SDUPTHER

## 2020-06-22 RX ORDER — SODIUM CHLORIDE 0.9 % (FLUSH) 0.9 %
10 SYRINGE (ML) INJECTION EVERY 12 HOURS SCHEDULED
Status: DISCONTINUED | OUTPATIENT
Start: 2020-06-22 | End: 2020-06-22 | Stop reason: HOSPADM

## 2020-06-22 RX ORDER — OXYCODONE HYDROCHLORIDE AND ACETAMINOPHEN 5; 325 MG/1; MG/1
1 TABLET ORAL EVERY 4 HOURS PRN
Status: DISCONTINUED | OUTPATIENT
Start: 2020-06-22 | End: 2020-06-23 | Stop reason: HOSPADM

## 2020-06-22 RX ORDER — IBUPROFEN 600 MG/1
600 TABLET ORAL EVERY 6 HOURS PRN
Status: DISCONTINUED | OUTPATIENT
Start: 2020-06-22 | End: 2020-06-23 | Stop reason: HOSPADM

## 2020-06-22 RX ORDER — DOCUSATE SODIUM 100 MG/1
100 CAPSULE, LIQUID FILLED ORAL DAILY PRN
Qty: 60 CAPSULE | Refills: 2 | Status: SHIPPED | OUTPATIENT
Start: 2020-06-22 | End: 2020-07-08

## 2020-06-22 RX ORDER — ONDANSETRON 4 MG/1
4 TABLET, ORALLY DISINTEGRATING ORAL EVERY 6 HOURS PRN
Status: DISCONTINUED | OUTPATIENT
Start: 2020-06-22 | End: 2020-06-23 | Stop reason: HOSPADM

## 2020-06-22 RX ORDER — MEPERIDINE HYDROCHLORIDE 25 MG/ML
12.5 INJECTION INTRAMUSCULAR; INTRAVENOUS; SUBCUTANEOUS EVERY 5 MIN PRN
Status: DISCONTINUED | OUTPATIENT
Start: 2020-06-22 | End: 2020-06-22 | Stop reason: HOSPADM

## 2020-06-22 RX ORDER — OXYCODONE HYDROCHLORIDE AND ACETAMINOPHEN 5; 325 MG/1; MG/1
1 TABLET ORAL EVERY 6 HOURS PRN
Qty: 28 TABLET | Refills: 0 | Status: SHIPPED | OUTPATIENT
Start: 2020-06-22 | End: 2020-06-23 | Stop reason: HOSPADM

## 2020-06-22 RX ORDER — 0.9 % SODIUM CHLORIDE 0.9 %
500 INTRAVENOUS SOLUTION INTRAVENOUS ONCE
Status: COMPLETED | OUTPATIENT
Start: 2020-06-22 | End: 2020-06-22

## 2020-06-22 RX ORDER — ACETAMINOPHEN 325 MG/1
650 TABLET ORAL EVERY 4 HOURS PRN
Status: DISCONTINUED | OUTPATIENT
Start: 2020-06-22 | End: 2020-06-23 | Stop reason: HOSPADM

## 2020-06-22 RX ORDER — FENTANYL CITRATE 50 UG/ML
INJECTION, SOLUTION INTRAMUSCULAR; INTRAVENOUS PRN
Status: DISCONTINUED | OUTPATIENT
Start: 2020-06-22 | End: 2020-06-22 | Stop reason: SDUPTHER

## 2020-06-22 RX ORDER — NALBUPHINE HCL 10 MG/ML
5 AMPUL (ML) INJECTION EVERY 4 HOURS PRN
Status: DISCONTINUED | OUTPATIENT
Start: 2020-06-22 | End: 2020-06-22 | Stop reason: HOSPADM

## 2020-06-22 RX ORDER — MORPHINE SULFATE 1 MG/ML
INJECTION, SOLUTION EPIDURAL; INTRATHECAL; INTRAVENOUS PRN
Status: DISCONTINUED | OUTPATIENT
Start: 2020-06-22 | End: 2020-06-22 | Stop reason: SDUPTHER

## 2020-06-22 RX ORDER — IBUPROFEN 600 MG/1
600 TABLET ORAL EVERY 6 HOURS PRN
Status: DISCONTINUED | OUTPATIENT
Start: 2020-07-04 | End: 2020-06-22

## 2020-06-22 RX ORDER — GLYCOPYRROLATE 1 MG/5 ML
SYRINGE (ML) INTRAVENOUS PRN
Status: DISCONTINUED | OUTPATIENT
Start: 2020-06-22 | End: 2020-06-22 | Stop reason: SDUPTHER

## 2020-06-22 RX ORDER — OXYCODONE HYDROCHLORIDE AND ACETAMINOPHEN 5; 325 MG/1; MG/1
2 TABLET ORAL EVERY 4 HOURS PRN
Status: DISCONTINUED | OUTPATIENT
Start: 2020-06-22 | End: 2020-06-23 | Stop reason: HOSPADM

## 2020-06-22 RX ORDER — DIPHENHYDRAMINE HCL 25 MG
25 TABLET ORAL EVERY 4 HOURS PRN
Status: DISCONTINUED | OUTPATIENT
Start: 2020-06-22 | End: 2020-06-23 | Stop reason: HOSPADM

## 2020-06-22 RX ORDER — ONDANSETRON 2 MG/ML
4 INJECTION INTRAMUSCULAR; INTRAVENOUS
Status: DISCONTINUED | OUTPATIENT
Start: 2020-06-22 | End: 2020-06-22 | Stop reason: HOSPADM

## 2020-06-22 RX ORDER — SODIUM CHLORIDE 0.9 % (FLUSH) 0.9 %
10 SYRINGE (ML) INJECTION PRN
Status: DISCONTINUED | OUTPATIENT
Start: 2020-06-22 | End: 2020-06-23 | Stop reason: HOSPADM

## 2020-06-22 RX ORDER — NALOXONE HYDROCHLORIDE 0.4 MG/ML
0.4 INJECTION, SOLUTION INTRAMUSCULAR; INTRAVENOUS; SUBCUTANEOUS PRN
Status: DISCONTINUED | OUTPATIENT
Start: 2020-06-22 | End: 2020-06-22 | Stop reason: HOSPADM

## 2020-06-22 RX ORDER — ONDANSETRON 2 MG/ML
4 INJECTION INTRAMUSCULAR; INTRAVENOUS EVERY 6 HOURS PRN
Status: DISCONTINUED | OUTPATIENT
Start: 2020-06-22 | End: 2020-06-22 | Stop reason: HOSPADM

## 2020-06-22 RX ORDER — BISACODYL 10 MG
10 SUPPOSITORY, RECTAL RECTAL ONCE
Status: DISCONTINUED | OUTPATIENT
Start: 2020-06-23 | End: 2020-06-23 | Stop reason: HOSPADM

## 2020-06-22 RX ORDER — LIDOCAINE HYDROCHLORIDE 10 MG/ML
1 INJECTION, SOLUTION EPIDURAL; INFILTRATION; INTRACAUDAL; PERINEURAL
Status: DISCONTINUED | OUTPATIENT
Start: 2020-06-22 | End: 2020-06-22 | Stop reason: HOSPADM

## 2020-06-22 RX ORDER — LIDOCAINE HYDROCHLORIDE 10 MG/ML
INJECTION, SOLUTION EPIDURAL; INFILTRATION; INTRACAUDAL; PERINEURAL PRN
Status: DISCONTINUED | OUTPATIENT
Start: 2020-06-22 | End: 2020-06-22 | Stop reason: SDUPTHER

## 2020-06-22 RX ORDER — MIDAZOLAM HYDROCHLORIDE 1 MG/ML
INJECTION INTRAMUSCULAR; INTRAVENOUS PRN
Status: DISCONTINUED | OUTPATIENT
Start: 2020-06-22 | End: 2020-06-22 | Stop reason: SDUPTHER

## 2020-06-22 RX ORDER — MORPHINE SULFATE 2 MG/ML
2 INJECTION, SOLUTION INTRAMUSCULAR; INTRAVENOUS EVERY 5 MIN PRN
Status: DISCONTINUED | OUTPATIENT
Start: 2020-06-22 | End: 2020-06-22 | Stop reason: HOSPADM

## 2020-06-22 RX ORDER — ROCURONIUM BROMIDE 10 MG/ML
INJECTION, SOLUTION INTRAVENOUS PRN
Status: DISCONTINUED | OUTPATIENT
Start: 2020-06-22 | End: 2020-06-22 | Stop reason: SDUPTHER

## 2020-06-22 RX ORDER — DIPHENHYDRAMINE HYDROCHLORIDE 50 MG/ML
12.5 INJECTION INTRAMUSCULAR; INTRAVENOUS
Status: DISCONTINUED | OUTPATIENT
Start: 2020-06-22 | End: 2020-06-22 | Stop reason: HOSPADM

## 2020-06-22 RX ORDER — IBUPROFEN 600 MG/1
600 TABLET ORAL EVERY 6 HOURS PRN
Qty: 30 TABLET | Refills: 0 | Status: SHIPPED | OUTPATIENT
Start: 2020-06-22

## 2020-06-22 RX ORDER — ONDANSETRON 2 MG/ML
INJECTION INTRAMUSCULAR; INTRAVENOUS PRN
Status: DISCONTINUED | OUTPATIENT
Start: 2020-06-22 | End: 2020-06-22 | Stop reason: SDUPTHER

## 2020-06-22 RX ORDER — NEOSTIGMINE METHYLSULFATE 5 MG/5 ML
SYRINGE (ML) INTRAVENOUS PRN
Status: DISCONTINUED | OUTPATIENT
Start: 2020-06-22 | End: 2020-06-22 | Stop reason: SDUPTHER

## 2020-06-22 RX ORDER — PROPOFOL 10 MG/ML
INJECTION, EMULSION INTRAVENOUS PRN
Status: DISCONTINUED | OUTPATIENT
Start: 2020-06-22 | End: 2020-06-22 | Stop reason: SDUPTHER

## 2020-06-22 RX ORDER — DOCUSATE SODIUM 100 MG/1
100 CAPSULE, LIQUID FILLED ORAL DAILY
Status: DISCONTINUED | OUTPATIENT
Start: 2020-06-23 | End: 2020-06-23 | Stop reason: HOSPADM

## 2020-06-22 RX ORDER — BUPIVACAINE HYDROCHLORIDE 5 MG/ML
INJECTION, SOLUTION EPIDURAL; INTRACAUDAL PRN
Status: DISCONTINUED | OUTPATIENT
Start: 2020-06-22 | End: 2020-06-22 | Stop reason: ALTCHOICE

## 2020-06-22 RX ORDER — LABETALOL 20 MG/4 ML (5 MG/ML) INTRAVENOUS SYRINGE
5 EVERY 10 MIN PRN
Status: DISCONTINUED | OUTPATIENT
Start: 2020-06-22 | End: 2020-06-22 | Stop reason: HOSPADM

## 2020-06-22 RX ADMIN — ROCURONIUM BROMIDE 40 MG: 10 INJECTION, SOLUTION INTRAVENOUS at 08:13

## 2020-06-22 RX ADMIN — SODIUM CHLORIDE, POTASSIUM CHLORIDE, SODIUM LACTATE AND CALCIUM CHLORIDE: 600; 310; 30; 20 INJECTION, SOLUTION INTRAVENOUS at 07:32

## 2020-06-22 RX ADMIN — Medication 0.4 MG: at 10:07

## 2020-06-22 RX ADMIN — DEXAMETHASONE SODIUM PHOSPHATE 4 MG: 4 INJECTION, SOLUTION INTRA-ARTICULAR; INTRALESIONAL; INTRAMUSCULAR; INTRAVENOUS; SOFT TISSUE at 08:13

## 2020-06-22 RX ADMIN — SODIUM CHLORIDE, POTASSIUM CHLORIDE, SODIUM LACTATE AND CALCIUM CHLORIDE: 600; 310; 30; 20 INJECTION, SOLUTION INTRAVENOUS at 07:57

## 2020-06-22 RX ADMIN — CEFAZOLIN 2 G: 10 INJECTION, POWDER, FOR SOLUTION INTRAVENOUS at 23:42

## 2020-06-22 RX ADMIN — Medication 3 MG: at 10:07

## 2020-06-22 RX ADMIN — DIPHENHYDRAMINE HCL 25 MG: 25 TABLET ORAL at 12:21

## 2020-06-22 RX ADMIN — CEFAZOLIN 2 G: 10 INJECTION, POWDER, FOR SOLUTION INTRAVENOUS at 17:47

## 2020-06-22 RX ADMIN — SODIUM CHLORIDE: 9 INJECTION, SOLUTION INTRAVENOUS at 12:04

## 2020-06-22 RX ADMIN — HYDROMORPHONE HYDROCHLORIDE 0.5 MG: 1 INJECTION, SOLUTION INTRAMUSCULAR; INTRAVENOUS; SUBCUTANEOUS at 10:46

## 2020-06-22 RX ADMIN — SODIUM CHLORIDE: 9 INJECTION, SOLUTION INTRAVENOUS at 19:31

## 2020-06-22 RX ADMIN — LIDOCAINE HYDROCHLORIDE 50 MG: 10 INJECTION, SOLUTION EPIDURAL; INFILTRATION; INTRACAUDAL; PERINEURAL at 08:13

## 2020-06-22 RX ADMIN — MIDAZOLAM 1 MG: 1 INJECTION INTRAMUSCULAR; INTRAVENOUS at 08:13

## 2020-06-22 RX ADMIN — FENTANYL CITRATE 50 MCG: 50 INJECTION, SOLUTION INTRAMUSCULAR; INTRAVENOUS at 09:39

## 2020-06-22 RX ADMIN — HYDROMORPHONE HYDROCHLORIDE 0.5 MG: 1 INJECTION, SOLUTION INTRAMUSCULAR; INTRAVENOUS; SUBCUTANEOUS at 10:40

## 2020-06-22 RX ADMIN — FENTANYL CITRATE 50 MCG: 50 INJECTION, SOLUTION INTRAMUSCULAR; INTRAVENOUS at 08:46

## 2020-06-22 RX ADMIN — SODIUM CHLORIDE, POTASSIUM CHLORIDE, SODIUM LACTATE AND CALCIUM CHLORIDE: 600; 310; 30; 20 INJECTION, SOLUTION INTRAVENOUS at 10:06

## 2020-06-22 RX ADMIN — MIDAZOLAM 1 MG: 1 INJECTION INTRAMUSCULAR; INTRAVENOUS at 07:58

## 2020-06-22 RX ADMIN — MORPHINE SULFATE 0.2 MG: 1 INJECTION EPIDURAL; INTRATHECAL; INTRAVENOUS at 08:10

## 2020-06-22 RX ADMIN — HEPARIN SODIUM 5000 UNITS: 5000 INJECTION INTRAVENOUS; SUBCUTANEOUS at 20:43

## 2020-06-22 RX ADMIN — SODIUM CHLORIDE: 9 INJECTION, SOLUTION INTRAVENOUS at 23:42

## 2020-06-22 RX ADMIN — ONDANSETRON 4 MG: 4 TABLET, ORALLY DISINTEGRATING ORAL at 18:05

## 2020-06-22 RX ADMIN — ONDANSETRON 4 MG: 2 INJECTION INTRAMUSCULAR; INTRAVENOUS at 09:48

## 2020-06-22 RX ADMIN — SODIUM CHLORIDE 500 ML: 0.9 INJECTION, SOLUTION INTRAVENOUS at 23:07

## 2020-06-22 RX ADMIN — CEFAZOLIN 2 G: 10 INJECTION, POWDER, FOR SOLUTION INTRAVENOUS at 08:19

## 2020-06-22 RX ADMIN — PROPOFOL 140 MG: 10 INJECTION, EMULSION INTRAVENOUS at 08:13

## 2020-06-22 RX ADMIN — ROCURONIUM BROMIDE 10 MG: 10 INJECTION, SOLUTION INTRAVENOUS at 08:47

## 2020-06-22 ASSESSMENT — PULMONARY FUNCTION TESTS
PIF_VALUE: 0
PIF_VALUE: 0
PIF_VALUE: 19
PIF_VALUE: 0
PIF_VALUE: 18
PIF_VALUE: 17
PIF_VALUE: 17
PIF_VALUE: 14
PIF_VALUE: 15
PIF_VALUE: 18
PIF_VALUE: 3
PIF_VALUE: 0
PIF_VALUE: 18
PIF_VALUE: 0
PIF_VALUE: 20
PIF_VALUE: 19
PIF_VALUE: 19
PIF_VALUE: 18
PIF_VALUE: 17
PIF_VALUE: 19
PIF_VALUE: 15
PIF_VALUE: 1
PIF_VALUE: 0
PIF_VALUE: 1
PIF_VALUE: 18
PIF_VALUE: 3
PIF_VALUE: 18
PIF_VALUE: 19
PIF_VALUE: 18
PIF_VALUE: 15
PIF_VALUE: 18
PIF_VALUE: 19
PIF_VALUE: 15
PIF_VALUE: 20
PIF_VALUE: 20
PIF_VALUE: 19
PIF_VALUE: 0
PIF_VALUE: 20
PIF_VALUE: 18
PIF_VALUE: 1
PIF_VALUE: 19
PIF_VALUE: 0
PIF_VALUE: 19
PIF_VALUE: 20
PIF_VALUE: 19
PIF_VALUE: 14
PIF_VALUE: 15
PIF_VALUE: 20
PIF_VALUE: 18
PIF_VALUE: 19
PIF_VALUE: 18
PIF_VALUE: 18
PIF_VALUE: 15
PIF_VALUE: 14
PIF_VALUE: 16
PIF_VALUE: 9
PIF_VALUE: 18
PIF_VALUE: 19
PIF_VALUE: 19
PIF_VALUE: 18
PIF_VALUE: 15
PIF_VALUE: 17
PIF_VALUE: 18
PIF_VALUE: 15
PIF_VALUE: 19
PIF_VALUE: 15
PIF_VALUE: 18
PIF_VALUE: 16
PIF_VALUE: 24
PIF_VALUE: 20
PIF_VALUE: 19
PIF_VALUE: 18
PIF_VALUE: 18
PIF_VALUE: 19
PIF_VALUE: 18
PIF_VALUE: 0
PIF_VALUE: 18
PIF_VALUE: 24
PIF_VALUE: 15
PIF_VALUE: 18
PIF_VALUE: 18
PIF_VALUE: 19
PIF_VALUE: 15
PIF_VALUE: 19
PIF_VALUE: 18
PIF_VALUE: 20
PIF_VALUE: 19
PIF_VALUE: 19
PIF_VALUE: 20
PIF_VALUE: 19
PIF_VALUE: 15
PIF_VALUE: 15
PIF_VALUE: 18
PIF_VALUE: 15
PIF_VALUE: 1
PIF_VALUE: 15
PIF_VALUE: 19
PIF_VALUE: 5
PIF_VALUE: 18
PIF_VALUE: 0
PIF_VALUE: 14
PIF_VALUE: 0
PIF_VALUE: 16
PIF_VALUE: 19
PIF_VALUE: 19
PIF_VALUE: 1
PIF_VALUE: 19
PIF_VALUE: 15
PIF_VALUE: 14
PIF_VALUE: 15
PIF_VALUE: 20
PIF_VALUE: 18
PIF_VALUE: 15
PIF_VALUE: 19
PIF_VALUE: 15
PIF_VALUE: 18
PIF_VALUE: 15
PIF_VALUE: 18
PIF_VALUE: 0
PIF_VALUE: 17
PIF_VALUE: 15
PIF_VALUE: 19
PIF_VALUE: 1
PIF_VALUE: 18
PIF_VALUE: 15
PIF_VALUE: 17
PIF_VALUE: 19
PIF_VALUE: 19
PIF_VALUE: 0
PIF_VALUE: 17
PIF_VALUE: 15
PIF_VALUE: 19
PIF_VALUE: 18
PIF_VALUE: 15

## 2020-06-22 ASSESSMENT — PAIN DESCRIPTION - PAIN TYPE
TYPE: SURGICAL PAIN

## 2020-06-22 ASSESSMENT — ENCOUNTER SYMPTOMS
STRIDOR: 0
SHORTNESS OF BREATH: 0

## 2020-06-22 ASSESSMENT — PAIN DESCRIPTION - LOCATION
LOCATION: ABDOMEN

## 2020-06-22 ASSESSMENT — PAIN DESCRIPTION - DESCRIPTORS
DESCRIPTORS: SORE
DESCRIPTORS: SORE

## 2020-06-22 ASSESSMENT — PAIN - FUNCTIONAL ASSESSMENT: PAIN_FUNCTIONAL_ASSESSMENT: 0-10

## 2020-06-22 ASSESSMENT — LIFESTYLE VARIABLES: SMOKING_STATUS: 0

## 2020-06-22 ASSESSMENT — PAIN DESCRIPTION - ORIENTATION: ORIENTATION: LOWER;MID

## 2020-06-22 ASSESSMENT — PAIN SCALES - GENERAL
PAINLEVEL_OUTOF10: 2
PAINLEVEL_OUTOF10: 7
PAINLEVEL_OUTOF10: 6
PAINLEVEL_OUTOF10: 8
PAINLEVEL_OUTOF10: 2
PAINLEVEL_OUTOF10: 6

## 2020-06-22 NOTE — H&P
Granulocyte 06/12/2020 NOT REPORTED  0.00 - 0.30 k/uL Final    WBC Morphology 06/12/2020 NOT REPORTED   Final    RBC Morphology 06/12/2020 NOT REPORTED   Final    Platelet Estimate 50/20/9781 NOT REPORTED   Final    Glucose 06/12/2020 80  70 - 99 mg/dL Final    BUN 06/12/2020 11  6 - 20 mg/dL Final    CREATININE 06/12/2020 0.77  0.50 - 0.90 mg/dL Final    Bun/Cre Ratio 06/12/2020 NOT REPORTED  9 - 20 Final    Calcium 06/12/2020 9.1  8.6 - 10.4 mg/dL Final    Sodium 06/12/2020 137  135 - 144 mmol/L Final    Potassium 06/12/2020 4.8  3.7 - 5.3 mmol/L Final    Chloride 06/12/2020 104  98 - 107 mmol/L Final    CO2 06/12/2020 24  20 - 31 mmol/L Final    Anion Gap 06/12/2020 9  9 - 17 mmol/L Final    GFR Non- 06/12/2020 >60  >60 mL/min Final    GFR  06/12/2020 >60  >60 mL/min Final    GFR Comment 06/12/2020        Final    Comment: Average GFR for 52-63 years old:   80 mL/min/1.73sq m  Chronic Kidney Disease:   <60 mL/min/1.73sq m  Kidney failure:   <15 mL/min/1.73sq m              eGFR calculated using average adult body mass.  Additional eGFR calculator available at:        Mediamind[employer]            GFR Staging 06/12/2020 NOT REPORTED   Final    PTT 06/12/2020 28.8  24.0 - 36.0 sec Final    Comment:       IV Heparin Therapy Range:      62.0-94.0           Hospital Outpatient Visit on 06/09/2020   Component Date Value Ref Range Status    Ventricular Rate 06/09/2020 55  BPM Final    Atrial Rate 06/09/2020 55  BPM Final    P-R Interval 06/09/2020 168  ms Final    QRS Duration 06/09/2020 90  ms Final    Q-T Interval 06/09/2020 440  ms Final    QTc Calculation (Bazett) 06/09/2020 420  ms Final    P Axis 06/09/2020 -2  degrees Final    R Axis 06/09/2020 -2  degrees Final    T Axis 06/09/2020 24  degrees Final   ]      The patient was counseled at length about the risks of sonia Covid-19 in the sierra-operative and post-operative states including the recovery window of their procedure. The patient was made aware that sonia Covid-19 after a surgical procedure may worsen their prognosis for recovering from the virus and lend to a higher morbidity and or mortality risk. The patient was given the options of postponing their procedure. All of the risks, benefits, and alternatives were discussed. The patient  does wish to proceed with the procedure. Assessment:       Diagnosis Orders   1. Procidentia of uterus grade 2      2. [place]-Walker grade 2 cystocele      3. Postmenopausal atrophic vaginitis      4. Overflow incontinence      5. Vaginal atrophy                  Patient Active Problem List     Diagnosis Date Noted    [place]-Walker grade 2 cystocele 01/14/2020       Priority: High    Procidentia of uterus grade 2 01/14/2020       Priority: High    Overflow incontinence 01/14/2020       Priority: High            Permanent Sterilization Completed: Yes      Plan:  MALI BSO +/- Enterocele; Possible Cystocele repair   Premarin cream 3 weeks bid prior to procedure completed  No orders of the defined types were placed in this encounter.           Counseling: The patient was counseled on all options both medical and surgical, conservative as well as definitive. She has elected to proceed with the procedure as stated above.     The patient was counseled on the procedure. Risks and complications were reviewed in detail. The patients orders, labs, consents have been completed. The history and physical as well as all supporting surgical documentation will be forwarded to the pre-operative holding area.     The patient is aware that this procedure may not alleviate her symptoms. That there may be a necessity for a second surgery and that there may be an incomplete removal of abnormal tissue. I reviewed with the patient that repair of her cystocele may lead to MARY and need for a second urologic procedure.  I also reviewed failure rates and breakdown of repair rates.                     Home care, Restrictions & Follow up Care review completed    Planned Office Follow up was reviewed with the patient and her family and is for  2 weeks. The patient will call to make this appointment unless she already has done so.

## 2020-06-22 NOTE — OP NOTE
The peritoneum was identified and entered digitally. This was taken superiorly and inferiorly, care not to involve the bowel or the bladder. The upper abdomen and lower pelvis was inspected and explored. There was no paraaortic or ileac lymphadenopathy. The liver was smooth and there was no peritoneal studding. The ureters were traced bilaterally, there was no renal bed pathology as palpated. The bowel was packed out of the surgical field with 3 lap sponges. The Small O'[place] retractor was utilized. All lateral blades and the attachments were palpated and there was no compression of underlying structures. Hoy Danny was placed on the fundus to bring the uterus up to the incision. A ribbon was used posteriorly. The round ligaments were identified and were double clamped and cut with the bovie cautery they were tied bilaterally. The vesico-uterine refection was released with blunt and sharp dissection. The bladder was pushed out of the surgical field. The fallopian tubes and ovaries were identified and grasped with an allis clamp and brought medially to the ligature backstop and will be removed with the specimen. A window was created in the broad ligament, the IP ligaments were cross clamped and tied x 2. Initially a ligature backstop was placed then a transfixation stitch was utilized after division of the tissue from between the cross clamped tissue with Metzenbaum scissors. Both sutures were \"O\"Vicryl. The posterior peritoneum was released with the Bovie cautery to drop the ureters bilaterally. This was a total of 2-3 mm. Curved Juan's were then placed at 45 degree angles at the Corporal-Isthmic Junction, clamping off the distal end of the uterine artery and vein. They were secured with \"O\" vicryl juan Stitches.  Serial straight Zeplen clamps staying parallel to the cervix with each pedicle secured with \"O\" vicryl ties, these ties incorporated 1/3rd of the previous pedicle and stayed parallel to the subcuticular space to aid in post operative pain management. The skin was closed with a subcuticular stitch of 4-0 vicryl with associated tinc-o-willy and steri-strips. It was then sterilely dressed. The patient was frogged on the OR table and the vaginal area was inspected. The prior grade  II cystocele had resolved with the newly supported cuff and there was a short anterior shelf. Repeat sponge needle and instrument counts were called for and found to be correct. The patient was brought out of anesthesia and moved to PACU. She will be admitted to the GYN Service. SCIP abx to continue. EPC's and Heparin for thromboembolic prophylaxis ordered. Surgical Assistant documentation:  The assistant surgeon was present to assist in the surgery and to give adequate visualization so the procedure could be completed in a safe manner with limited risks to the patient. The patients co-morbidities required the need for additional trained personnel to complete this procedure. Body mass index is 34.2 kg/m². (Wound Vac Applied if > 35)  Wound-Vac Applied No:     Disposition:  The patient will return to my office in 2 weeks. We will review her pathology report and any further recommendations. She was counseled with her family that she is to report any temperature more than 100.4 F, pelvic pain, or heavy vaginal bleeding; She is to refrain from intercourse douching or tampons; No hot tubs baths lakes or pools. No driving. The patient voiced understanding of the above counseling.           Truman German DO    Electronically signed by Truman German DO on 6/22/20 at 10:07 AM EDT

## 2020-06-22 NOTE — PROGRESS NOTES
Patient Name: Renato Menjivar  Patient : 1960  Room/Bed: 6883/6421-78  Admission Date/Time: 2020  5:51 AM      Date: 2020  Time: 3:01 PM    Day of Surgery  Hospital Day: 669 Adam Bauer is a 61 y.o. female [id number]    This patient was seen today. She is POD #0 S/P HYSTERECTOMY ABDOMINAL TOTAL BSO, ENTEROCELE REPAIR. Today she feels well. Her pain is well controlled with current pain medications. The patient is urinating well via herndon catheter. She denies chest pain, shortness of breath, headache, lightheadedness, blurred vision, peripheral edema, palpitations, dry cough and fatigue. She is not yet ambulating. Her bowels are regular. She denies any vaginal bleeding. The patient is tolerating clears. Ordered dinner. She is passing flatus. Patient denies COPD or lung disease. Reports she is former smoker and quit 3-4 years ago. Oxygen saturation dips to low 92% when she is sleeping or talking. Denies any dyspnea, shortness of breath or cough, denies chest pain or tachycardia. Encouraged deep breathing exercises and incentive spirometer at bedside.      Medications and Infusions:    Scheduled Medications:    ceFAZolin (ANCEF) IVPB  2 g Intravenous Q8H    heparin (porcine)  5,000 Units Subcutaneous Once    [START ON 2020] heparin (porcine)  5,000 Units Subcutaneous 3 times per day    [START ON 2020] bisacodyl  10 mg Rectal Once    [START ON 2020] docusate sodium  100 mg Oral Daily     Continuous Infusions:    sodium chloride 125 mL/hr at 20 1204     PRN Medications: sodium chloride flush, acetaminophen, [START ON 2020] ibuprofen, promethazine, ondansetron, oxyCODONE-acetaminophen **OR** oxyCODONE-acetaminophen, diphenhydrAMINE      PHYSICAL EXAM:  Vitals:    20 1050 20 1100 20 1110 20 1132   BP: 101/64 (!) 93/49 (!) 90/56 (!) 93/54   Pulse: 57 52 54 (!) 47   Resp: 17 14 15 16   Temp:  97.3 °F (36.3 °C)  96.4 °F (35.8 °C)   TempSrc: Infrared  Infrared   SpO2: 96% 92% 97%    Weight:       Height:             Intake/Output:   Last Shift: I/O last 3 completed shifts: In: 1300 [I.V.:1300]  Out: 250 [Urine:200; Blood:50]  Current Shift: No intake/output data recorded. 375 mL out in the last 6hrs ~62.5ml/hr    General Appearance:   alert, appears stated age and cooperative    Mental Status:  alert, oriented to person, place, and time, normal mood, behavior, speech, dress, motor activity, and thought processes    Lungs:  Normal expansion. Clear to auscultation. No rales, rhonchi, or wheezing.     Heart:  normal rate, normal S1 and S2, no gallops, intact distal pulses and no carotid bruits    Abdomen:  soft, nontender, nondistended, no masses or organomegaly, no rebound, rigidity, guarding, no CVA tenderness      Incision: ABD with overlying Tegaderm, small amount of serosanguinous fluid on dressing, plan to remove tomorrow  no significant drainage      Extremities:  peripheral pulses normal, 1+ nonpitting pedal edema, no clubbing or cyanosis      Vaginal Vault: not inspected              Assessment:  Farrukh Guy is a 61 y.o. female  [id number] POD # 0 s/p HYSTERECTOMY ABDOMINAL TOTAL BSO, ENTEROCELE REPAIR     Hospital Day: 1   - Doing well, vitals stable   - continue herndon catheter, UOP good   - IVF: NS @125 ml/hr    - Encourage ambulation and use of incentive spirometer   - Pain control: s/p spinal, Motrin/Percocet   - Labs: H&H and BMP 6/23   - DVT Proph: SCDs and Heparin at 8pm and 8am   - Abx: Ancef x 24   - Diet: General advance as tolerated   - Pathology: pending   - Continue post-op care, please page with any questions    Patient Active Problem List   Diagnosis    [place]-Walker grade 2 cystocele    Procidentia of uterus grade 2    Overflow incontinence    Acquired pelvic enterocele    MALI-BSO Enterocele repair 6/22/2020       Condition: good          Plan:  Orders Placed This Encounter   Procedures    Urinalysis Reflex to Culture

## 2020-06-22 NOTE — DISCHARGE SUMMARY
taking these medications    PROBIOTIC-10 PO        ASK your doctor about these medications    conjugated estrogens 0.625 MG/GM vaginal cream  Commonly known as:  Premarin  One applicator intravaginally twice daily for 2-3 weeks prior to surgery. estradiol 0.1 MG/GM vaginal cream  Commonly known as:  ESTRACE VAGINAL  Place 1 g vaginally daily           Where to Get Your Medications      You can get these medications from any pharmacy    Bring a paper prescription for each of these medications  · acetaminophen 500 MG tablet  · docusate sodium 100 MG capsule  · ibuprofen 600 MG tablet  · oxyCODONE-acetaminophen 5-325 MG per tablet         Activity: pelvic rest x 6 weeks, no driving on narcotics, no lifting greater than 15 lbs  Diet: regular diet  Follow up: 7/8/2020 as scheduled for postoperative visit     Condition on discharge: good and stable   Discharge Date: 6/23/2020     Comments:  Home care, Follow-up care, restrictions reviewed.     John James DO  Ob/Gyn Resident  [facility]  6/23/2020, 11:51 AM

## 2020-06-23 VITALS
RESPIRATION RATE: 16 BRPM | DIASTOLIC BLOOD PRESSURE: 60 MMHG | HEIGHT: 62 IN | HEART RATE: 60 BPM | WEIGHT: 187 LBS | TEMPERATURE: 98 F | BODY MASS INDEX: 34.41 KG/M2 | OXYGEN SATURATION: 96 % | SYSTOLIC BLOOD PRESSURE: 106 MMHG

## 2020-06-23 LAB
ANION GAP SERPL CALCULATED.3IONS-SCNC: 10 MMOL/L (ref 9–17)
BUN BLDV-MCNC: 12 MG/DL (ref 6–20)
BUN/CREAT BLD: ABNORMAL (ref 9–20)
CALCIUM SERPL-MCNC: 8.4 MG/DL (ref 8.6–10.4)
CHLORIDE BLD-SCNC: 103 MMOL/L (ref 98–107)
CO2: 22 MMOL/L (ref 20–31)
CREAT SERPL-MCNC: 0.67 MG/DL (ref 0.5–0.9)
GFR AFRICAN AMERICAN: >60 ML/MIN
GFR NON-AFRICAN AMERICAN: >60 ML/MIN
GFR SERPL CREATININE-BSD FRML MDRD: ABNORMAL ML/MIN/{1.73_M2}
GFR SERPL CREATININE-BSD FRML MDRD: ABNORMAL ML/MIN/{1.73_M2}
GLUCOSE BLD-MCNC: 101 MG/DL (ref 70–99)
HCT VFR BLD CALC: 34 % (ref 36–46)
HEMOGLOBIN: 11.3 G/DL (ref 12–16)
POTASSIUM SERPL-SCNC: 4.4 MMOL/L (ref 3.7–5.3)
SODIUM BLD-SCNC: 135 MMOL/L (ref 135–144)
SURGICAL PATHOLOGY REPORT: NORMAL

## 2020-06-23 PROCEDURE — 6370000000 HC RX 637 (ALT 250 FOR IP): Performed by: STUDENT IN AN ORGANIZED HEALTH CARE EDUCATION/TRAINING PROGRAM

## 2020-06-23 PROCEDURE — 85014 HEMATOCRIT: CPT

## 2020-06-23 PROCEDURE — 85018 HEMOGLOBIN: CPT

## 2020-06-23 PROCEDURE — 36415 COLL VENOUS BLD VENIPUNCTURE: CPT

## 2020-06-23 PROCEDURE — 99024 POSTOP FOLLOW-UP VISIT: CPT | Performed by: OBSTETRICS & GYNECOLOGY

## 2020-06-23 PROCEDURE — 6360000002 HC RX W HCPCS: Performed by: STUDENT IN AN ORGANIZED HEALTH CARE EDUCATION/TRAINING PROGRAM

## 2020-06-23 PROCEDURE — 2580000003 HC RX 258: Performed by: STUDENT IN AN ORGANIZED HEALTH CARE EDUCATION/TRAINING PROGRAM

## 2020-06-23 PROCEDURE — 80048 BASIC METABOLIC PNL TOTAL CA: CPT

## 2020-06-23 RX ORDER — 0.9 % SODIUM CHLORIDE 0.9 %
500 INTRAVENOUS SOLUTION INTRAVENOUS ONCE
Status: COMPLETED | OUTPATIENT
Start: 2020-06-23 | End: 2020-06-23

## 2020-06-23 RX ORDER — ACETAMINOPHEN 500 MG
1000 TABLET ORAL EVERY 6 HOURS PRN
Qty: 30 TABLET | Refills: 3 | Status: SHIPPED | OUTPATIENT
Start: 2020-06-23

## 2020-06-23 RX ADMIN — HEPARIN SODIUM 5000 UNITS: 5000 INJECTION INTRAVENOUS; SUBCUTANEOUS at 08:23

## 2020-06-23 RX ADMIN — SODIUM CHLORIDE: 9 INJECTION, SOLUTION INTRAVENOUS at 04:58

## 2020-06-23 RX ADMIN — SODIUM CHLORIDE: 9 INJECTION, SOLUTION INTRAVENOUS at 02:29

## 2020-06-23 RX ADMIN — SODIUM CHLORIDE 500 ML: 0.9 INJECTION, SOLUTION INTRAVENOUS at 01:58

## 2020-06-23 RX ADMIN — MAGNESIUM HYDROXIDE 30 ML: 400 SUSPENSION ORAL at 08:23

## 2020-06-23 RX ADMIN — DOCUSATE SODIUM 100 MG: 100 CAPSULE, LIQUID FILLED ORAL at 08:23

## 2020-06-23 RX ADMIN — IBUPROFEN 600 MG: 600 TABLET, FILM COATED ORAL at 08:23

## 2020-06-23 RX ADMIN — Medication 10 ML: at 07:24

## 2020-06-23 ASSESSMENT — PAIN DESCRIPTION - FREQUENCY: FREQUENCY: CONTINUOUS

## 2020-06-23 ASSESSMENT — PAIN DESCRIPTION - DESCRIPTORS: DESCRIPTORS: DISCOMFORT

## 2020-06-23 ASSESSMENT — PAIN SCALES - GENERAL
PAINLEVEL_OUTOF10: 0
PAINLEVEL_OUTOF10: 1
PAINLEVEL_OUTOF10: 2

## 2020-06-23 ASSESSMENT — PAIN DESCRIPTION - ONSET: ONSET: ON-GOING

## 2020-06-23 ASSESSMENT — PAIN DESCRIPTION - LOCATION: LOCATION: ABDOMEN

## 2020-06-23 ASSESSMENT — PAIN DESCRIPTION - ORIENTATION: ORIENTATION: MID;LOWER

## 2020-06-23 ASSESSMENT — PAIN DESCRIPTION - PAIN TYPE: TYPE: ACUTE PAIN;SURGICAL PAIN

## 2020-06-23 NOTE — PROGRESS NOTES
no longer get pregnant. Birth control is not necessary. · If your ovaries are removed, you may experience menopausal symptoms, which can be controlled with medicine. · You may have a change in your sexual response. If your uterus has been removed, uterine contractions you may have felt during orgasm will no longer occur. · If the ovaries have been removed, vaginal dryness may be a problem. Estrogen can help relieve this. · You may enjoy sex more because you no longer feel pain from the condition. · If you experience a sense of loss or feel depressed after your surgery, it is important to talk to your doctor about these feelings since they can be treated. Follow-up   Schedule a follow-up appointment as directed by your doctor. · If you have had a subtotal hysterectomy (meaning you still have your cervix), continue to have regular Pap smears . · If you had this procedure because of cancer, other treatment, such as radiation or hormonal therapy, may be used as well. Call Your Doctor If Any of the Following Occurs   It is important for you to monitor your recovery once you leave the hospital. That way, you can alert your doctor to any problems immediately. If any of the following occurs, call your doctor:   · Signs of infection, including fever and chills   · Redness, swelling, increasing pain, excessive bleeding, leakage, or any discharge from the incision site   · Incision opens up   · Nausea and/or vomiting that you cannot control with the medicines you were given after surgery, or which persist for more than two days after discharge from the hospital   · Dizziness or fainting   · Cough, shortness of breath, or chest pain   · Heavy bleeding   · Pain that you cannot control with the medicines you have been given   · Pain, burning, urgency or frequency of urination, or persistent bleeding in the urine   · Swelling, redness, or pain in your leg   In case of an emergency,  CALL 911  immediately. Home care, Restrictions & Follow up Care review completed    RTO 2 weeks          Attending's Name:  Electronically signed by Hipolito Murrell DO on 6/23/2020 at 10:15 AM

## 2020-06-23 NOTE — PROGRESS NOTES
good        Plan:  Orders Placed This Encounter   Procedures    Urinalysis Reflex to Culture     Urine per catheter insertion  Pre-op diagnosis:  PROCENDENTIA CYSTOCELE GRADE II     Standing Status:   Standing     Number of Occurrences:   1    Surgical Pathology     UTERUS AND CERVIX/OR6/TM  Pre-op diagnosis:  PROCENDENTIA CYSTOCELE GRADE II     Standing Status:   Standing     Number of Occurrences:   1    Surgical Pathology     FALLOPIAN TUBE AND OVARY   Pre-op diagnosis:  PROCENDENTIA CYSTOCELE GRADE II     Standing Status:   Standing     Number of Occurrences:   1    Surgical Pathology     FALLOPIAN TUBE AND OVARY RIGHT   Pre-op diagnosis:  PROCENDENTIA CYSTOCELE GRADE II     Standing Status:   Standing     Number of Occurrences:   1    Basic Metabolic Panel     Standing Status:   Standing     Number of Occurrences:   1    Hemoglobin and hematocrit, blood     Standing Status:   Standing     Number of Occurrences:   1    Surgical Pathology     Standing Status:   Standing     Number of Occurrences:   1    DIET GENERAL;     Standing Status:   Standing     Number of Occurrences:   1    Vital signs per unit routine     Standing Status:   Standing     Number of Occurrences:   1    Notify physician for     Notify physician for pulse less than 50 or greater than 120, respiratory rate less than 12 or greater than 25, temperature greater than 101.3 F (38.5 C), urinary output less than 120 mL in four hours, systolic BP less than 90 or greater than 972, diastolic BP less than 50 or greater than 100. Standing Status:   Standing     Number of Occurrences:   1    Ambulate patient     Progressive ambulation.      Standing Status:   Standing     Number of Occurrences:   1    Adv Diet as Cole (nurse communication)     Standing Status:   Standing     Number of Occurrences:   1    Intake and output     Call for urine output less than 120 ml in 4 hours     Standing Status:   Standing     Number of Occurrences:   19   

## 2020-06-23 NOTE — FLOWSHEET NOTE
Discharge teaching and instructions completed. AVS reviewed. Printed prescriptions filled by [facility] outpatient meds to beds program.  Patient voiced understanding regarding prescriptions, follow up appointments, and care of self at home. Discharged home per wheelchair.

## 2020-07-08 ENCOUNTER — OFFICE VISIT (OUTPATIENT)
Dept: OBGYN CLINIC | Age: 60
End: 2020-07-08

## 2020-07-08 VITALS
HEIGHT: 62 IN | TEMPERATURE: 97.9 F | SYSTOLIC BLOOD PRESSURE: 116 MMHG | BODY MASS INDEX: 34.23 KG/M2 | HEART RATE: 82 BPM | DIASTOLIC BLOOD PRESSURE: 72 MMHG | WEIGHT: 186 LBS

## 2020-07-08 PROCEDURE — 99024 POSTOP FOLLOW-UP VISIT: CPT | Performed by: OBSTETRICS & GYNECOLOGY

## 2020-07-08 NOTE — PROGRESS NOTES
[facility]  [address] [place] [address]. [place], [address] [facility]  [phone]  Fax: [phone]  SURGICAL PATHOLOGY REPORT    Patient Name: Vincent Meneses  MR#: [id number]  Specimen #[id number]       Final Diagnosis  SPECIMEN \"A\":  LEFT FALLOPIAN TUBE AND LEFT OVARY, LEFT                       SALPINGO-OOPHORECTOMY. LEFT OVARY WITH PARATUBAL CYSTS. LEFT OVARY WITH NO SIGNIFICANT HISTOPATHOLOGY. SPECIMEN \"B\":  RIGHT FALLOPIAN TUBE AND RIGHT OVARY, RIGHT                       SALPINGO-OOPHORECTOMY. RIGHT FALLOPIAN TUBE WITH PARATUBAL CYST.    RIGHT OVARY WITH NO SIGNIFICANT HISTOPATHOLOGY. SPECIMEN \"C\":  UTERUS WITH CERVIX, TOTAL HYSTERECTOMY:         CERVIX WITH NO SIGNIFICANT HISTOPATHOLOGY. PROLIFERATIVE PHASE ENDOMETRIUM. ADENOMYOSIS. SEROSA WITH NO SIGNIFICANT HISTOPATHOLOGY. Mello Bone M.D.  **Electronically Signed Out**         [facility]/6/23/2020    Clinical Information  Procidentia cystocele grad e II; hysterectomy abdominal total BSO with  enterocele repair; enterocele cystocele; formalin time: A) 9:47, B)  9:49, C) 9:20    Source:  A: Fallopian tube and ovary, left  B: Fallopian tube and ovary, right  C: Uterus and cervix    Gross Description  Specimen \"A\":  Received in formalin labeled with the patient's name,  identifier and \"left fallopian tube and left ovary\" is left ovary  measuring 2.5 x 1.5 x 1 cm with attached left fallopian tube. The  left fallopian tube with fimbriated end measures 5 cm in length by 0.5  cm in diameter with two paratubal cysts measuring 0.3 and 0.5 cm in  diameter. A prior tubal ligation is noted. The cut surface of left  ovary is unremarkable. Representative sections are submitted as  follows:  cassette A1-left fallopian tube; cassette A2-left ovary.    YC/melodie    Specimen \"B\":  Received in formalin labeled with the patient's name,  identifier and \" right fallopian tube and right ovary \" is right ovary  measuring 3 x 1.5 x 1 cm. The cut surface i s unremarkable. The right  fallopian tube measures 4 cm in length by 0.5 cm in diameter with a  paratubal cyst measuring 0.8 cm in diameter. The prior tubal ligation  is noted. Representative sections are submitted as follows:  cassette  B1-right fallopian tube; cassette B2-ovary. /    Specimen \"C\":  Received in formalin labeled with the patient's name,  identifier and \"uterus\" is a uterus with attached cervix. The uterus  measures 5 cm in length by 3.5 cm in width by 2 cm in thickness. The  endocervical canal measures 4 cm in length. The ectocervical mucosa  surface measures 3 x 2 cm and the os measures 1.5 cm in diameter. The  specimen weighs 50 grams. The endometrial cavity measures 1.6 cm  cornua to cornua and 2 cm in length. The endometrium measures 0.1 cm  and the myometrium measures 1.5 cm in thickness. Cut section reveals  no mass lesion. Representative sections are submitted as follows:   cassette C1-anterior and posterior cervix; cassette C2-anterior and  post erior endomyometrium; cassette C3-serosa. /        Microscopic Description  Specimen \"A\":  Two H&E stained slides are reviewed. Microscopic  examination is performed. Specimen \"B\":  Two H&E stained slides are reviewed. Microscopic  examination is performed. Specimen \"C\": Three H&E stained slides are reviewed. Microscopic  examination is performed.        Basic Metabolic Panel   Result Value Ref Range    Glucose 101 (H) 70 - 99 mg/dL    BUN 12 6 - 20 mg/dL    CREATININE 0.67 0.50 - 0.90 mg/dL    Bun/Cre Ratio NOT REPORTED 9 - 20    Calcium 8.4 (L) 8.6 - 10.4 mg/dL    Sodium 135 135 - 144 mmol/L    Potassium 4.4 3.7 - 5.3 mmol/L    Chloride 103 98 - 107 mmol/L    CO2 22 20 - 31 mmol/L    Anion Gap 10 9 - 17 mmol/L    GFR Non-African American >60 >60 mL/min    GFR African American >60 >60 mL/min    GFR Comment          GFR Staging NOT REPORTED    Hemoglobin and hematocrit, blood   Result Value Ref Range    Hemoglobin 11.3 (L) 12.0 - 16.0 g/dL    Hematocrit 34.0 (L) 36 - 46 %           Assessment:      Diagnosis Orders   1. Postop check     2. Acquired pelvic enterocele     3. MALI-BSO Enterocele repair 6/22/2020          Patient Active Problem List    Diagnosis Date Noted    [place]-Walker grade 2 cystocele 01/14/2020     Priority: High    Procidentia of uterus grade 2 01/14/2020     Priority: High    Overflow incontinence 01/14/2020     Priority: High    Postoperative state     AMLI-BSO Enterocele repair 6/22/2020 06/22/2020    Acquired pelvic enterocele           POD# 15   Procedure:   PROCEDURE DATE: 6/22/2020      Pre-op Diagnosis: PROCENDENTIA CYSTOCELE GRADE II Overflow Incontinence     Post-op Diagnosis: Same; Acquired Pelvic Enterocele     Procedure: Procedure(s): HYSTERECTOMY ABDOMINAL TOTAL BSO   ENTEROCELE Repair   Stable   Pathology reviewed and found to be benign. Yes    Plan:   Return in about 4 weeks (around 8/5/2020) for Post Operative Evaluation. Continue with restrictions. Pelvic rest. No lifting or intercourse. No baths or pools. No douching or tampons.    Return to office 4 weeks

## 2020-08-04 ENCOUNTER — OFFICE VISIT (OUTPATIENT)
Dept: OBGYN CLINIC | Age: 60
End: 2020-08-04

## 2020-08-04 VITALS
SYSTOLIC BLOOD PRESSURE: 118 MMHG | WEIGHT: 191 LBS | BODY MASS INDEX: 35.15 KG/M2 | TEMPERATURE: 97.9 F | HEIGHT: 62 IN | DIASTOLIC BLOOD PRESSURE: 72 MMHG | HEART RATE: 75 BPM

## 2020-08-04 PROCEDURE — 99024 POSTOP FOLLOW-UP VISIT: CPT | Performed by: OBSTETRICS & GYNECOLOGY

## 2020-08-04 NOTE — PROGRESS NOTES
Mariaelena Toribio Haily  8/4/2020  11:15 AM      Alexus CUNNINGHAM Haily  Procedure:   PROCEDURE DATE: 6/22/2020      Pre-op Diagnosis: PROCENDENTIA CYSTOCELE GRADE II Overflow Incontinence     Post-op Diagnosis: Same; Acquired Pelvic Enterocele     Procedure: Procedure(s): HYSTERECTOMY ABDOMINAL TOTAL BSO   ENTEROCELE Repair      Franca Irvin is a 61 y.o. female [id number]      The patient was seen, she denies any complaints. She denied any shortness of breath, chest pain or dizziness. She denied any nausea, vomiting, or diarrhea. There is no fever, chills, or rigors. The patient denies any vaginal bleeding, discharge or odor. All of her pre-operative complaints are now resolved. Blood pressure 118/72, pulse 75, temperature 97.9 °F (36.6 °C), height 5' 2\" (1.575 m), weight 191 lb (86.6 kg), last menstrual period 07/04/2008, not currently breastfeeding. Abdominal Exam: soft non-tender. Good bowel sounds. No guarding, rebound or rigidity. No costal vertebral angle tenderness bilateral. No hernias    Incision: healing well, no drainage, no erythema, no hernia, no seroma, no swelling, well approximated    Extremities: No edema or calf pain noted bilaterally. Pelvic Exam: Good length and support. No defects. No discharge no s/s infection Cuff healed. + Granulation tissue 5 mm x 3 mm . TX with Bisi Arredondo.  IODINE wash completed with valente swab      Results for orders placed or performed during the hospital encounter of 06/22/20   Urinalysis Reflex to Culture    Specimen: Urine, indwelling catheter   Result Value Ref Range    Color, UA YELLOW YELLOW    Turbidity UA CLEAR CLEAR    Glucose, Ur NEGATIVE NEGATIVE    Bilirubin Urine NEGATIVE NEGATIVE    Ketones, Urine NEGATIVE NEGATIVE    Specific Gravity, UA 1.026 1.000 - 1.030    Urine Hgb NEGATIVE NEGATIVE    pH, UA 7.0 5.0 - 8.0    Protein, UA NEGATIVE NEGATIVE    Urobilinogen, Urine Normal Normal    Nitrite, Urine NEGATIVE NEGATIVE    Leukocyte Esterase, Urine NEGATIVE NEGATIVE Urinalysis Comments       Microscopic exam not performed based on chemical results unless requested in original order. Surgical Pathology   Result Value Ref Range    Surgical Pathology Report       [id number]  [place] [id number]. [place], [id number] [place] [address]  [phone]  Fax: [phone]  SURGICAL PATHOLOGY REPORT    Patient Name: Piyush Dillon  MR#: [id number]  Specimen #[id number]       Final Diagnosis  SPECIMEN \"A\":  LEFT FALLOPIAN TUBE AND LEFT OVARY, LEFT                       SALPINGO-OOPHORECTOMY. LEFT OVARY WITH PARATUBAL CYSTS. LEFT OVARY WITH NO SIGNIFICANT HISTOPATHOLOGY. SPECIMEN \"B\":  RIGHT FALLOPIAN TUBE AND RIGHT OVARY, RIGHT                       SALPINGO-OOPHORECTOMY. RIGHT FALLOPIAN TUBE WITH PARATUBAL CYST.    RIGHT OVARY WITH NO SIGNIFICANT HISTOPATHOLOGY. SPECIMEN \"C\":  UTERUS WITH CERVIX, TOTAL HYSTERECTOMY:         CERVIX WITH NO SIGNIFICANT HISTOPATHOLOGY. PROLIFERATIVE PHASE ENDOMETRIUM. ADENOMYOSIS. SEROSA WITH NO SIGNIFICANT HISTOPATHOLOGY. Blue Kelly M.D.  **Electronically Signed Out**         [facility]/6/23/2020    Clinical Information  Procidentia cystocele grad e II; hysterectomy abdominal total BSO with  enterocele repair; enterocele cystocele; formalin time: A) 9:47, B)  9:49, C) 9:20    Source:  A: Fallopian tube and ovary, left  B: Fallopian tube and ovary, right  C: Uterus and cervix    Gross Description  Specimen \"A\":  Received in formalin labeled with the patient's name,  identifier and \"left fallopian tube and left ovary\" is left ovary  measuring 2.5 x 1.5 x 1 cm with attached left fallopian tube. The  left fallopian tube with fimbriated end measures 5 cm in length by 0.5  cm in diameter with two paratubal cysts measuring 0.3 and 0.5 cm in  diameter. A prior tubal ligation is noted. The cut surface of left  ovary is unremarkable.   Representative sections are submitted as  follows: cassette A1-left fallopian tube; cassette A2-left ovary. /    Specimen \"B\":  Received in formalin labeled with the patient's name,  identifier and \" right fallopian tube and right ovary \" is right ovary  measuring 3 x 1.5 x 1 cm. The cut surface i s unremarkable. The right  fallopian tube measures 4 cm in length by 0.5 cm in diameter with a  paratubal cyst measuring 0.8 cm in diameter. The prior tubal ligation  is noted. Representative sections are submitted as follows:  cassette  B1-right fallopian tube; cassette B2-ovary. /    Specimen \"C\":  Received in formalin labeled with the patient's name,  identifier and \"uterus\" is a uterus with attached cervix. The uterus  measures 5 cm in length by 3.5 cm in width by 2 cm in thickness. The  endocervical canal measures 4 cm in length. The ectocervical mucosa  surface measures 3 x 2 cm and the os measures 1.5 cm in diameter. The  specimen weighs 50 grams. The endometrial cavity measures 1.6 cm  cornua to cornua and 2 cm in length. The endometrium measures 0.1 cm  and the myometrium measures 1.5 cm in thickness. Cut section reveals  no mass lesion. Representative sections are submitted as follows:   cassette C1-anterior and posterior cervix; cassette C2-anterior and  post erior endomyometrium; cassette C3-serosa. /        Microscopic Description  Specimen \"A\":  Two H&E stained slides are reviewed. Microscopic  examination is performed. Specimen \"B\":  Two H&E stained slides are reviewed. Microscopic  examination is performed. Specimen \"C\": Three H&E stained slides are reviewed. Microscopic  examination is performed.        Basic Metabolic Panel   Result Value Ref Range    Glucose 101 (H) 70 - 99 mg/dL    BUN 12 6 - 20 mg/dL    CREATININE 0.67 0.50 - 0.90 mg/dL    Bun/Cre Ratio NOT REPORTED 9 - 20    Calcium 8.4 (L) 8.6 - 10.4 mg/dL    Sodium 135 135 - 144 mmol/L    Potassium 4.4 3.7 - 5.3 mmol/L    Chloride 103 98 - 107 mmol/L    CO2 22 20 - 31 mmol/L    Anion Gap 10 9 - 17 mmol/L    GFR Non-African American >60 >60 mL/min    GFR African American >60 >60 mL/min    GFR Comment          GFR Staging NOT REPORTED    Hemoglobin and hematocrit, blood   Result Value Ref Range    Hemoglobin 11.3 (L) 12.0 - 16.0 g/dL    Hematocrit 34.0 (L) 36 - 46 %           Assessment:      Diagnosis Orders   1. Postop check     2. MALI-BSO Enterocele repair 6/22/2020     3. Granulation tissue of vagina  IA CHEMICAL CAUTERIZATION OF GRANULATION TISSUE        Patient Active Problem List    Diagnosis Date Noted    [place]-Walker grade 2 cystocele 01/14/2020     Priority: High    Procidentia of uterus grade 2 01/14/2020     Priority: High    Overflow incontinence 01/14/2020     Priority: High    Postoperative state     MALI-BSO Enterocele repair 6/22/2020 06/22/2020    Acquired pelvic enterocele           POD# ~6 weeks   Procedure: PROCEDURE DATE: 6/22/2020     Procedure: Procedure(s): HYSTERECTOMY ABDOMINAL TOTAL BSO   ENTEROCELE Repair  Stable  Pathology reviewed and found to be benign.  Yes    Plan:   Return in about 23 weeks (around 1/12/2021) for Annual.   Return to office for her annual  RTO 2 week fu on granulation tissue-spec  No intercourse

## 2020-08-26 ENCOUNTER — OFFICE VISIT (OUTPATIENT)
Dept: OBGYN CLINIC | Age: 60
End: 2020-08-26

## 2020-08-26 VITALS
HEART RATE: 86 BPM | TEMPERATURE: 97.8 F | BODY MASS INDEX: 34.96 KG/M2 | SYSTOLIC BLOOD PRESSURE: 118 MMHG | HEIGHT: 62 IN | WEIGHT: 190 LBS | DIASTOLIC BLOOD PRESSURE: 72 MMHG

## 2020-08-26 PROCEDURE — 99024 POSTOP FOLLOW-UP VISIT: CPT | Performed by: OBSTETRICS & GYNECOLOGY

## 2020-08-26 NOTE — PROGRESS NOTES
Maggi Carpenter A Haily  8/26/2020  10:00 AM      Alexus OCTAVIO Haily  Procedure:   PROCEDURE DATE: 6/22/2020      Pre-op Diagnosis: PROCENDENTIA CYSTOCELE GRADE II Overflow Incontinence     Post-op Diagnosis: Same; Acquired Pelvic Enterocele     Procedure: Procedure(s): HYSTERECTOMY ABDOMINAL TOTAL BSO   ENTEROCELE Repair      Sy Donahue is a 61 y.o. female [id number]      The patient was seen, she denies any complaints. She denied any shortness of breath, chest pain or dizziness. She denied any nausea, vomiting, or diarrhea. There is no fever, chills, or rigors. The patient denies any vaginal bleeding, discharge or odor. All of her pre-operative complaints are now resolved. Blood pressure 118/72, pulse 86, temperature 97.8 °F (36.6 °C), height 5' 2\" (1.575 m), weight 190 lb (86.2 kg), last menstrual period 07/04/2008, not currently breastfeeding. Abdominal Exam: soft non-tender. Good bowel sounds. No guarding, rebound or rigidity. No costal vertebral angle tenderness bilateral. No hernias    Incision: healing well, no drainage, no erythema, no hernia, no seroma, no swelling, well approximated    Extremities: No edema or calf pain noted bilaterally. Pelvic Exam: Cuff healed . Good length and support no defects No discharge only a very small 2 mm area of cuff with minute amount of granulation tissue TX completed with AGNO2 today.  Abstain reviewed 2 weeks      Results for orders placed or performed during the hospital encounter of 06/22/20   Urinalysis Reflex to Culture    Specimen: Urine, indwelling catheter   Result Value Ref Range    Color, UA YELLOW YELLOW    Turbidity UA CLEAR CLEAR    Glucose, Ur NEGATIVE NEGATIVE    Bilirubin Urine NEGATIVE NEGATIVE    Ketones, Urine NEGATIVE NEGATIVE    Specific Gravity, UA 1.026 1.000 - 1.030    Urine Hgb NEGATIVE NEGATIVE    pH, UA 7.0 5.0 - 8.0    Protein, UA NEGATIVE NEGATIVE    Urobilinogen, Urine Normal Normal    Nitrite, Urine NEGATIVE NEGATIVE    Leukocyte Esterase, Urine NEGATIVE NEGATIVE    Urinalysis Comments       Microscopic exam not performed based on chemical results unless requested in original order. Surgical Pathology   Result Value Ref Range    Surgical Pathology Report       [id number]  [facility]  [facility]. [place], [id number] [place] [address]  [phone]  Fax: [phone]  SURGICAL PATHOLOGY REPORT    Patient Name: Gopi Man  MR#: [id number]  Specimen #[id number]       Final Diagnosis  SPECIMEN \"A\":  LEFT FALLOPIAN TUBE AND LEFT OVARY, LEFT                       SALPINGO-OOPHORECTOMY. LEFT OVARY WITH PARATUBAL CYSTS. LEFT OVARY WITH NO SIGNIFICANT HISTOPATHOLOGY. SPECIMEN \"B\":  RIGHT FALLOPIAN TUBE AND RIGHT OVARY, RIGHT                       SALPINGO-OOPHORECTOMY. RIGHT FALLOPIAN TUBE WITH PARATUBAL CYST.    RIGHT OVARY WITH NO SIGNIFICANT HISTOPATHOLOGY. SPECIMEN \"C\":  UTERUS WITH CERVIX, TOTAL HYSTERECTOMY:         CERVIX WITH NO SIGNIFICANT HISTOPATHOLOGY. PROLIFERATIVE PHASE ENDOMETRIUM. ADENOMYOSIS. SEROSA WITH NO SIGNIFICANT HISTOPATHOLOGY. Nick Ford M.D.  **Electronically Signed Out**         [facility]/6/23/2020    Clinical Information  Procidentia cystocele grad e II; hysterectomy abdominal total BSO with  enterocele repair; enterocele cystocele; formalin time: A) 9:47, B)  9:49, C) 9:20    Source:  A: Fallopian tube and ovary, left  B: Fallopian tube and ovary, right  C: Uterus and cervix    Gross Description  Specimen \"A\":  Received in formalin labeled with the patient's name,  identifier and \"left fallopian tube and left ovary\" is left ovary  measuring 2.5 x 1.5 x 1 cm with attached left fallopian tube. The  left fallopian tube with fimbriated end measures 5 cm in length by 0.5  cm in diameter with two paratubal cysts measuring 0.3 and 0.5 cm in  diameter. A prior tubal ligation is noted. The cut surface of left  ovary is unremarkable.   Representative sections are submitted as  follows:  cassette A1-left fallopian tube; cassette A2-left ovary. /    Specimen \"B\":  Received in formalin labeled with the patient's name,  identifier and \" right fallopian tube and right ovary \" is right ovary  measuring 3 x 1.5 x 1 cm. The cut surface i s unremarkable. The right  fallopian tube measures 4 cm in length by 0.5 cm in diameter with a  paratubal cyst measuring 0.8 cm in diameter. The prior tubal ligation  is noted. Representative sections are submitted as follows:  cassette  B1-right fallopian tube; cassette B2-ovary. /    Specimen \"C\":  Received in formalin labeled with the patient's name,  identifier and \"uterus\" is a uterus with attached cervix. The uterus  measures 5 cm in length by 3.5 cm in width by 2 cm in thickness. The  endocervical canal measures 4 cm in length. The ectocervical mucosa  surface measures 3 x 2 cm and the os measures 1.5 cm in diameter. The  specimen weighs 50 grams. The endometrial cavity measures 1.6 cm  cornua to cornua and 2 cm in length. The endometrium measures 0.1 cm  and the myometrium measures 1.5 cm in thickness. Cut section reveals  no mass lesion. Representative sections are submitted as follows:   cassette C1-anterior and posterior cervix; cassette C2-anterior and  post erior endomyometrium; cassette C3-serosa. /        Microscopic Description  Specimen \"A\":  Two H&E stained slides are reviewed. Microscopic  examination is performed. Specimen \"B\":  Two H&E stained slides are reviewed. Microscopic  examination is performed. Specimen \"C\": Three H&E stained slides are reviewed. Microscopic  examination is performed.        Basic Metabolic Panel   Result Value Ref Range    Glucose 101 (H) 70 - 99 mg/dL    BUN 12 6 - 20 mg/dL    CREATININE 0.67 0.50 - 0.90 mg/dL    Bun/Cre Ratio NOT REPORTED 9 - 20    Calcium 8.4 (L) 8.6 - 10.4 mg/dL    Sodium 135 135 - 144 mmol/L    Potassium 4.4 3.7 - 5.3 mmol/L Chloride 103 98 - 107 mmol/L    CO2 22 20 - 31 mmol/L    Anion Gap 10 9 - 17 mmol/L    GFR Non-African American >60 >60 mL/min    GFR African American >60 >60 mL/min    GFR Comment          GFR Staging NOT REPORTED    Hemoglobin and hematocrit, blood   Result Value Ref Range    Hemoglobin 11.3 (L) 12.0 - 16.0 g/dL    Hematocrit 34.0 (L) 36 - 46 %           Assessment:      Diagnosis Orders   1. Postop check     2. MALI-BSO Enterocele repair 6/22/2020     3. Granulation tissue of vagina  ME CHEMICAL CAUTERIZATION OF GRANULATION TISSUE        Patient Active Problem List    Diagnosis Date Noted    [place]-Walker grade 2 cystocele 01/14/2020     Priority: High    Procidentia of uterus grade 2 01/14/2020     Priority: High    Overflow incontinence 01/14/2020     Priority: High    Postoperative state     MALI-BSO Enterocele repair 6/22/2020 06/22/2020    Acquired pelvic enterocele           POD# 35   Procedure: HYSTERECTOMY ABDOMINAL TOTAL BSO   ENTEROCELE Repair   Stable   Pathology reviewed and found to be benign.  Yes    Plan:   Return in about 5 months (around 1/19/2021) for Annual.  Annual 1/2021    Orders Placed This Encounter   Procedures    ME CHEMICAL CAUTERIZATION OF GRANULATION TISSUE

## 2021-02-02 ENCOUNTER — HOSPITAL ENCOUNTER (OUTPATIENT)
Age: 61
Setting detail: SPECIMEN
Discharge: HOME | End: 2021-02-02
Payer: COMMERCIAL

## 2021-02-02 ENCOUNTER — OFFICE VISIT (OUTPATIENT)
Dept: OBGYN CLINIC | Age: 61
End: 2021-02-02
Payer: COMMERCIAL

## 2021-02-02 VITALS
TEMPERATURE: 97.6 F | OXYGEN SATURATION: 100 % | DIASTOLIC BLOOD PRESSURE: 72 MMHG | BODY MASS INDEX: 35.55 KG/M2 | HEART RATE: 64 BPM | SYSTOLIC BLOOD PRESSURE: 112 MMHG | HEIGHT: 62 IN | WEIGHT: 193.2 LBS

## 2021-02-02 DIAGNOSIS — Z90.79 S/P TAH-BSO: ICD-10-CM

## 2021-02-02 DIAGNOSIS — N89.8 GRANULATION TISSUE OF VAGINA: ICD-10-CM

## 2021-02-02 DIAGNOSIS — Z90.710 S/P TAH-BSO: ICD-10-CM

## 2021-02-02 DIAGNOSIS — Z01.419 WELL WOMAN EXAM WITH ROUTINE GYNECOLOGICAL EXAM: Primary | ICD-10-CM

## 2021-02-02 DIAGNOSIS — Z90.722 S/P TAH-BSO: ICD-10-CM

## 2021-02-02 LAB
BILIRUBIN URINE: NEGATIVE
COLOR: YELLOW
COMMENT UA: NORMAL
GLUCOSE URINE: NEGATIVE
KETONES, URINE: NEGATIVE
LEUKOCYTE ESTERASE, URINE: NEGATIVE
NITRITE, URINE: NEGATIVE
PH UA: 6 (ref 5–8)
PROTEIN UA: NEGATIVE
SPECIFIC GRAVITY UA: 1.03 (ref 1–1.03)
TURBIDITY: CLEAR
URINE HGB: NEGATIVE
UROBILINOGEN, URINE: NORMAL

## 2021-02-02 PROCEDURE — 81003 URINALYSIS AUTO W/O SCOPE: CPT

## 2021-02-02 PROCEDURE — 17250 CHEM CAUT OF GRANLTJ TISSUE: CPT | Performed by: OBSTETRICS & GYNECOLOGY

## 2021-02-02 PROCEDURE — 99396 PREV VISIT EST AGE 40-64: CPT | Performed by: OBSTETRICS & GYNECOLOGY

## 2021-02-02 NOTE — PROGRESS NOTES
History and Physical  [address], [address] [address], [address] [facility] [address] [phone]   Fax [phone]  Katy Fernández  2021              61 y.o. Chief Complaint   Patient presents with    Gynecologic Exam       Patient's last menstrual period was 2008. Primary Care Physician: Chelsea Diana    The patient was seen and examined. She has no chief complaint today and is here for her annual exam.  Her bowels are regular. There are no voiding complaints. She denies any bloating. She denies vaginal discharge and was counseled on STD's and the need for barrier contraception.      HPI : Katy Fernández is a 61 y.o. female [id number]    NO CC Annual . Had HYST BSO Enterocele 2020 Pt is not sexually active.   ________________________________________________________________________  OB History    Para Term  AB Living   5 2 2 0 3 2   SAB TAB Ectopic Molar Multiple Live Births   3 0 0 0 0 2      # Outcome Date GA Lbr Bola/2nd Weight Sex Delivery Anes PTL Lv   5 Term 95   7 lb 7 oz (3.374 kg) F Vag-Spont   CLAUDIA   4 SAB 03/15/94           3 Term 86   7 lb 7 oz (3.374 kg) M Vag-Spont   CLAUDIA   2 SAB 10/01/85           1 SAB 09/15/77             Past Medical History:   Diagnosis Date    Seasonal allergies     Wears glasses                                                                    Past Surgical History:   Procedure Laterality Date    CATARACT REMOVAL WITH IMPLANT Bilateral 2009    HERNIA REPAIR  2009    Darryl fundiplication    SKIN GRAFT      MALI AND BSO Bilateral 2020    HYSTERECTOMY ABDOMINAL TOTAL BSO performed by Kalen Mercado DO [facility] [address] [place] [facility]  [facility]    ENTEROCELE REPAIR performed by Kalen Mercado DO at [address]     Family History   Problem Relation Age of Onset    Lung Cancer Father     Hypertension Mother     Hypertension Sister Social History     Socioeconomic History    Marital status:      Spouse name: Not on file    Number of children: Not on file    Years of education: Not on file    Highest education level: Not on file   Occupational History    Not on file   Social Needs    Financial resource strain: Not on file    Food insecurity     Worry: Not on file     Inability: Not on file    Transportation needs     Medical: Not on file     Non-medical: Not on file   Tobacco Use    Smoking status: Former Smoker     Packs/day: 2.00     Years: 36.00     Pack years: 72.00     Types: Cigarettes     Start date: 1978     Quit date: 2017     Years since quittin.0    Smokeless tobacco: Never Used   Substance and Sexual Activity    Alcohol use:  Yes     Alcohol/week: 0.0 standard drinks     Frequency: Monthly or less     Drinks per session: 1 or 2     Binge frequency: Never     Comment: occossional    Drug use: Never    Sexual activity: Not on file   Lifestyle    Physical activity     Days per week: Not on file     Minutes per session: Not on file    Stress: Not on file   Relationships    Social connections     Talks on phone: Not on file     Gets together: Not on file     Attends Adventism service: Not on file     Active member of club or organization: Not on file     Attends meetings of clubs or organizations: Not on file     Relationship status: Not on file    Intimate partner violence     Fear of current or ex partner: Not on file     Emotionally abused: Not on file     Physically abused: Not on file     Forced sexual activity: Not on file   Other Topics Concern    Not on file   Social History Narrative    Not on file       MEDICATIONS:  Current Outpatient Medications   Medication Sig Dispense Refill    acetaminophen (APAP EXTRA STRENGTH) 500 MG tablet Take 2 tablets by mouth every 6 hours as needed for Pain 30 tablet 3  ibuprofen (ADVIL;MOTRIN) 600 MG tablet Take 1 tablet by mouth every 6 hours as needed for Pain 30 tablet 0    Probiotic Product (PROBIOTIC-10 PO) Take 1 capsule by mouth daily       No current facility-administered medications for this visit. ALLERGIES:  Allergies as of 02/02/2021    (No Known Allergies)       Symptoms of decreased mood absent  Symptoms of anhedonia absent    **If either question is answered in a  positive fashion then complete the PHQ9 Scoring Evaluation and make the appropriate referral**      Immunization status: stated as current, but no records available. Gynecologic History:  Menarche: 15 yo  Menopause at 49 yo     Patient's last menstrual period was 07/04/2008. Sexually Active: Yes    STD History: No     Permanent Sterilization: Yes hyst   Reversible Birth Control: No        Hormone Replacement Exposure: No      Genetic Qualified Family History of Breast, Ovarian , Colon or Uterine Cancer: No     If YES see scanned worksheet. Preventative Health Testing:    Health Maintenance:  Health Maintenance Due   Topic Date Due    Hepatitis C screen  1960    HIV screen  07/05/1975    DTaP/Tdap/Td vaccine (1 - Tdap) 07/05/1979    Lipid screen  07/05/2000    Diabetes screen  07/05/2000    Shingles Vaccine (1 of 2) 07/05/2010    Colon cancer screen colonoscopy  07/05/2010    Low dose CT lung screening  07/05/2015    Flu vaccine (1) 09/01/2020       Date of Last Pap Smear: 1/2020 WNL Neg HPV  Abnormal Pap Smear History: na  Colposcopy History:   Date of Last Mammogram: ordered  Date of Last Colonoscopy: pt to schedule after covid  Date of Last Bone Density:      ________________________________________________________________________        REVIEW OF SYSTEMS:       A minimum of an eleven point review of systems was completed. Review Of Systems (11 point):  Constitutional: No fever, chills or malaise;  No weight change or fatigue Head and Eyes: No , Headache, Dizziness or trauma in last 12 months; + glasses  ENT ROS: No hearing, Tinnitis, sinus or taste problems  Hematological and Lymphatic ROS:No Lymphoma, Von Willebrand's, Hemophillia or Bleeding History  Psych ROS: No Depression, Homicidal thoughts,suicidal thoughts, or anxiety  Breast ROS: No prior breast abnormalities or lumps  Respiratory ROS: No SOB, Pneumoniae,Cough, or Pulmonary Embolism History  Cardiovascular ROS: No Chest Pain with Exertion, Palpitations, Syncope, Edema, Arrhythmia  Gastrointestinal ROS: No Indigestion, Heartburn, Nausea, vomiting, Diarrhea, Constipation,or Bowel Changes; No Bloody Stools or melena  Genito-Urinary ROS: No Dysuria, Hematuria or Nocturia. No Urinary Incontinence or Vaginal Discharge  Musculoskeletal ROS: No Arthralgia, Arthritis,Gout,Osteoporosis or Rheumatism  Neurological ROS: No CVA, Migraines, Epilepsy, Seizure Hx, or Limb Weakness  Dermatological ROS: No Rash, Itching, Hives, Mole Changes or Cancer                                                                                                                                                                                                                                  PHYSICAL Exam:     Constitutional:  Vitals:    02/02/21 1249   BP: 112/72   Site: Right Upper Arm   Position: Sitting   Cuff Size: Medium Adult   Pulse: 64   Temp: 97.6 °F (36.4 °C)   TempSrc: Temporal   SpO2: 100%   Weight: 193 lb 3.2 oz (87.6 kg)   Height: 5' 2\" (1.575 m)       Chaperone for Intimate Exam  ? Chaperone was offered and accepted as part of the rooming process. ? Chaperone: Zelda          General Appearance: This  is a well Developed, well Nourished, well groomed female. Her BMI was reviewed. Nutritional decision making was discussed. Skin:  There was a Normal Inspection of the skin without rashes or lesions. There were no rashes.   (Papular, Maculopapular, Hives, Pustular, Macular) There were no lesions (Ulcers, Erythema, Abn. Appearing Nevi)            Lymphatic:  No Lymph Nodes were Palpable in the neck , axilla or groin.  0 # Of Lymph Nodes; Location ; Character [Normal]  [Shotty] [Tender] [Enlarged]     Neck and EENT:  The neck was supple. There were no masses   The thyroid was not enlarged and had no masses. Perrla, EOMI B/L, TMI B/L No Abnormalities. Throat inspected-No exudates or Masses, Nares Patent No Masses        Respiratory: The lungs were auscultated and found to be clear. There were no rales, rhonchi or wheezes. There was a good respiratory effort. Cardiovascular: The heart was in a regular rate and rhythm. . No S3 or S4. There was no murmur appreciated. Location, grade, and radiation are not applicable. Extremities: The patients extremities were without calf tenderness, edema, or varicosities. There was full range of motion in all four extremities. Pulses in all four extremities were appreciated and are 2/4. Abdomen: The abdomen was soft and non-tender. There were good bowel sounds in all quadrants and there was no guarding, rebound or rigidity. On evaluation there was no evidence of hepatosplenomegaly and there was no costal vertebral landy tenderness bilaterally. No hernias were appreciated. Abdominal Scars: YES    Psych: The patient had a normal Orientation to: Time, Place, Person, and Situation  There is no Mood / Affect changes    Breast:  (Chest)  normal appearance, no masses or tenderness, Inspection negative, No nipple retraction or dimpling, No nipple discharge or bleeding, No axillary or supraclavicular adenopathy, Normal to palpation without dominant masses, Taught monthly breast self examination  Self breast exams were reviewed in detail. Literature was given. Pelvic Exam:  External genitalia: normal general appearance.  Hair loss  Urinary system: urethral meatus normal and smooth nt bladder If Negative Cytology, Follow-up screening per current guidelines. Mammograms every 1 year. If 35 yo and last mammogram was negative. Calcium and Vitamin D dosing reviewed. Colonoscopy screening reviewed as well as onset for bone density testing. barrier recommendations discussed. STD counseling and prevention reviewed. Routine health maintenance per patients PCP. Orders Placed This Encounter   Procedures    Urinalysis Reflex to Culture     Standing Status:   Future     Standing Expiration Date:   4/2/2021     Order Specific Question:   SPECIFY(EX-CATH,MIDSTREAM,CYSTO,ETC)? Answer:   MID    SC CHEMICAL CAUTERIZATION OF GRANULATION TISSUE           The patient, Farrukh Sharma is a 61 y.o. female, was seen with a total time spent of 40 minutes for the visit on this date of service by the E/M provider. The time component had both face to face and non face to face time spent in determining the total time component. Counseling and education regarding her diagnosis listed below and her options regarding those diagnoses were also included in determining her time component. Diagnosis Orders   1. Well woman exam with routine gynecological exam  Urinalysis Reflex to Culture   2. MALI-BSO Enterocele repair 6/22/2020     3. Granulation tissue of vagina  SC CHEMICAL CAUTERIZATION OF GRANULATION TISSUE        The patient had her preventative health maintenance recommendations and follow-up reviewed with her at the completion of her visit.

## 2021-03-22 ENCOUNTER — HOSPITAL ENCOUNTER (OUTPATIENT)
Dept: WOMENS IMAGING | Age: 61
Discharge: HOME | End: 2021-03-24
Payer: COMMERCIAL

## 2021-03-22 DIAGNOSIS — Z12.31 ENCOUNTER FOR SCREENING MAMMOGRAM FOR MALIGNANT NEOPLASM OF BREAST: ICD-10-CM

## 2021-03-22 DIAGNOSIS — Z12.31 ENCOUNTER FOR SCREENING MAMMOGRAM FOR BREAST CANCER: ICD-10-CM

## 2021-03-22 PROCEDURE — 77063 BREAST TOMOSYNTHESIS BI: CPT

## 2021-12-20 ENCOUNTER — APPOINTMENT (OUTPATIENT)
Dept: GENERAL RADIOLOGY | Age: 61
End: 2021-12-20
Payer: COMMERCIAL

## 2021-12-20 ENCOUNTER — HOSPITAL ENCOUNTER (EMERGENCY)
Age: 61
Discharge: HOME | End: 2021-12-20
Attending: EMERGENCY MEDICINE
Payer: COMMERCIAL

## 2021-12-20 VITALS
WEIGHT: 200 LBS | SYSTOLIC BLOOD PRESSURE: 105 MMHG | HEIGHT: 62 IN | RESPIRATION RATE: 17 BRPM | HEART RATE: 77 BPM | OXYGEN SATURATION: 98 % | BODY MASS INDEX: 36.8 KG/M2 | DIASTOLIC BLOOD PRESSURE: 91 MMHG | TEMPERATURE: 97.7 F

## 2021-12-20 DIAGNOSIS — M79.89 LEFT LEG SWELLING: Primary | ICD-10-CM

## 2021-12-20 LAB — D-DIMER QUANTITATIVE: 0.51 MG/L FEU (ref 0–0.59)

## 2021-12-20 PROCEDURE — 93971 EXTREMITY STUDY: CPT

## 2021-12-20 PROCEDURE — 85379 FIBRIN DEGRADATION QUANT: CPT

## 2021-12-20 PROCEDURE — 73630 X-RAY EXAM OF FOOT: CPT

## 2021-12-20 PROCEDURE — 36415 COLL VENOUS BLD VENIPUNCTURE: CPT

## 2021-12-20 PROCEDURE — 73610 X-RAY EXAM OF ANKLE: CPT

## 2021-12-20 PROCEDURE — 99283 EMERGENCY DEPT VISIT LOW MDM: CPT

## 2021-12-20 RX ORDER — HYDROCODONE BITARTRATE AND ACETAMINOPHEN 5; 325 MG/1; MG/1
1 TABLET ORAL EVERY 6 HOURS PRN
Qty: 8 TABLET | Refills: 0 | Status: SHIPPED | OUTPATIENT
Start: 2021-12-20 | End: 2021-12-23

## 2021-12-20 ASSESSMENT — PAIN DESCRIPTION - PAIN TYPE: TYPE: ACUTE PAIN

## 2021-12-20 ASSESSMENT — PAIN DESCRIPTION - LOCATION: LOCATION: FOOT

## 2021-12-20 ASSESSMENT — PAIN DESCRIPTION - ORIENTATION: ORIENTATION: LEFT

## 2021-12-20 ASSESSMENT — PAIN SCALES - GENERAL: PAINLEVEL_OUTOF10: 9

## 2021-12-20 NOTE — ED TRIAGE NOTES
Mode of arrival (squad #, walk in, police, etc) : Walk in        Chief complaint(s): Foot pain, leg swelling        Arrival Note (brief scenario, treatment PTA, etc). : Pt arrives to ED c/o left foot pain. Patient states that the pain has been ongoing for a few months. Patient states that on 12/6/21 she had an ultrasound done for a DVT that was negative as well as an X-ray that showed arthritis. Patient states that she was started on Meloxicam but reports that she still has pain and swelling. C= \"Have you ever felt that you should Cut down on your drinking? \"  No  A= \"Have people Annoyed you by criticizing your drinking? \"  No  G= \"Have you ever felt bad or Guilty about your drinking? \"  No  E= \"Have you ever had a drink as an Eye-opener first thing in the morning to steady your nerves or to help a hangover? \"  No      Deferred []      Reason for deferring: N/A    *If yes to two or more: probable alcohol abuse. *

## 2021-12-20 NOTE — ED PROVIDER NOTES
[address]  eMERGENCY dEPARTMENT eNCOUnter      Pt Name: Reddy Perera  MRN: [id number]  Julitagflori 1960  Date of evaluation: 12/20/2021  Provider: Dallin Huber PA-C    CHIEF COMPLAINT       Chief Complaint   Patient presents with    Foot Pain    Leg Swelling           HISTORY OF PRESENT ILLNESS  (Location/Symptom, Timing/Onset, Context/Setting, Quality, Duration, Modifying Factors, Severity.)   Reddy Perera is a 64 y.o. female who presents to the emergency department for evaluation of left leg pain and swelling. Symptoms have been intermittent for several months. Worse when she is on her feet at work, better when leg is elevated. Pt denies injury. Did see her PCP on 12/6. Had a negative venous doppler and imaging showed arthritis. Pt states today she was at work and the pain in her ankle and foot became severe. States the swelling also worsened. Reports she was having difficulty bearing weight. Denies numbness, fever, chills, cp, sob, cough. Does take meloxicam.  Denies cardiac hx. No other complaints. Nursing Notes were reviewed. REVIEW OF SYSTEMS    (2-9 systems for level 4, 10 or more for level 5)     Review of Systems   Leg pain  Leg swelling      Except as noted above the remainder of the review of systems was reviewed and negative.        PAST MEDICAL HISTORY     Past Medical History:   Diagnosis Date    Seasonal allergies     Wears glasses      None otherwise stated in nurses notes    SURGICAL HISTORY       Past Surgical History:   Procedure Laterality Date    CATARACT REMOVAL WITH IMPLANT Bilateral 2009    HERNIA REPAIR  08/2009    Darryl fundiplication    SKIN GRAFT  2007    MALI AND BSO Bilateral 6/22/2020    HYSTERECTOMY ABDOMINAL TOTAL BSO performed by Fer Asencio DO at [address] [facility] [place]  6/22/2020    ENTEROCELE REPAIR performed by Fer Asencio DO at [address]     None otherwise stated in nurses notes    CURRENT MEDICATIONS       Discharge Medication List as of 2021  2:29 PM      CONTINUE these medications which have NOT CHANGED    Details   acetaminophen (APAP EXTRA STRENGTH) 500 MG tablet Take 2 tablets by mouth every 6 hours as needed for Pain, Disp-30 tablet, R-3Print      ibuprofen (ADVIL;MOTRIN) 600 MG tablet Take 1 tablet by mouth every 6 hours as needed for Pain, Disp-30 tablet, R-0Print      Probiotic Product (PROBIOTIC-10 PO) Take 1 capsule by mouth dailyHistorical Med             ALLERGIES     Patient has no known allergies. FAMILY HISTORY           Problem Relation Age of Onset    Lung Cancer Father     Hypertension Mother     Hypertension Sister      Family Status   Relation Name Status    Father      Mother  Alive    Sister  Alive      None otherwise stated in nurses notes    SOCIAL HISTORY      reports that she quit smoking about 4 years ago. Her smoking use included cigarettes. She started smoking about 43 years ago. She has a 72.00 pack-year smoking history. She has never used smokeless tobacco. She reports current alcohol use. She reports that she does not use drugs. lives at home with others     PHYSICAL EXAM    (up to 7 for level 4, 8 or more for level 5)     ED Triage Vitals [21 1306]   BP Temp Temp Source Pulse Resp SpO2 Height Weight   (!) 105/91 97.7 °F (36.5 °C) Oral 77 17 98 % 5' 2\" (1.575 m) 200 lb (90.7 kg)       Physical Exam   Nursing note and vitals reviewed. Constitutional: Oriented to person, place, and time and well-developed, well-nourished. Head: Normocephalic and atraumatic. Ear: External ears normal.   Nose: Nose normal and midline. Eyes: Conjunctivae and EOM are normal. Pupils are equal, round, and reactive to light. Neck: Normal range of motion. Neck supple.    Throat: Posterior pharynx is without erythema or exudates, airway is patent, no swelling  Cardiovascular: Normal rate, regular rhythm, normal heart sounds and intact distal pulses. Pulmonary/Chest: Effort normal and breath sounds normal. No respiratory distress. No wheezes. No rales. No chest tenderness. Abdominal: Soft. Bowel sounds are normal. No distension and no mass. There is no tenderness. There is no rebound and no guarding. Musculoskeletal: Examination of left lower leg reveals +1 pitting edema in lower leg and ankle. There is no erythema, rash, warmth. Tenderness over the ankle, foot. No calf pain. FROM. 5/5 strength. 2/2 dp and pt pulses. Brisk cap refill. Distal sensation intact. Ambulatory with pain. Neurological: Alert and oriented to person, place, and time. GCS score is 15. Skin: Skin is warm and dry. No rash noted. No erythema. No pallor. Psychiatric: Mood, memory, affect and judgment normal.           DIAGNOSTIC RESULTS     EKG: All EKG's are interpreted by the Emergency Department Physician who either signs or Co-signs this chart in the absence of a cardiologist.        RADIOLOGY:   All plain film, CT, MRI, and formal ultrasound images (except ED bedside ultrasound) are read by the radiologist, see reports below, unless otherwise noted in MDM or here. XR FOOT LEFT (MIN 3 VIEWS)   Final Result   Left ankle:      1. Mild degenerative changes as detailed above. Mild soft tissue edema in   the left leg and left ankle. 2. No acute fracture or dislocation. 3. Small tibiotalar joint effusion. Left foot:      1. Mild soft tissue edema of the left foot and ankle. Mild degenerative   changes as detailed above. Small tibiotalar joint effusion. 2. No acute fracture or dislocation. 3. Moderate plantar calcaneal spur. XR ANKLE LEFT (MIN 3 VIEWS)   Final Result   Left ankle:      1. Mild degenerative changes as detailed above. Mild soft tissue edema in   the left leg and left ankle. 2. No acute fracture or dislocation. 3. Small tibiotalar joint effusion. Left foot:      1. Mild soft tissue edema of the left foot and ankle.   Mild degenerative   changes as detailed above. Small tibiotalar joint effusion. 2. No acute fracture or dislocation. 3. Moderate plantar calcaneal spur. VL Lower Extremity Venous Left    (Results Pending)       XR ANKLE LEFT (MIN 3 VIEWS)    Result Date: 12/20/2021  EXAMINATION: THREE XRAY VIEWS OF THE LEFT FOOT; THREE XRAY VIEWS OF THE LEFT ANKLE 12/20/2021 1:15 pm COMPARISON: None. HISTORY: ORDERING SYSTEM PROVIDED HISTORY: pain, swelling TECHNOLOGIST PROVIDED HISTORY: pain, swelling 30-year-old female with left ankle pain and swelling FINDINGS: Left ankle: Ankle mortise is intact. Osseous alignment is normal.  Mild degenerative changes of the midfoot and TMT joints. Moderate plantar calcaneal spur. Os trigonum variant. Calcification along the distal Achilles tendon. Small tibiotalar joint effusion. Mild soft tissue edema in the left leg and left ankle. No acute fracture or gross dislocation is seen. Boehler's angle is maintained. Left foot: Osseous alignment is normal.  Moderate degenerative change of the 1st MTP/MTS joints. Mild degenerative changes of the midfoot and TMT joints. Mild soft tissue edema of the left foot and left ankle. Small tibiotalar joint effusion. Moderate plantar calcaneal spur. Calcification along the distal Achilles tendon. No marginal erosions are identified. No acute fracture or gross dislocation is seen. The medial and middle cuneiforms demonstrate proper alignment with the base of the 1st and 2nd metatarsals respectively. Boehler's angle is maintained. Left ankle: 1. Mild degenerative changes as detailed above. Mild soft tissue edema in the left leg and left ankle. 2. No acute fracture or dislocation. 3. Small tibiotalar joint effusion. Left foot: 1. Mild soft tissue edema of the left foot and ankle. Mild degenerative changes as detailed above. Small tibiotalar joint effusion. 2. No acute fracture or dislocation. 3. Moderate plantar calcaneal spur.      XR FOOT LEFT (MIN 3 VIEWS)    Result Date: 12/20/2021  EXAMINATION: THREE XRAY VIEWS OF THE LEFT FOOT; THREE XRAY VIEWS OF THE LEFT ANKLE 12/20/2021 1:15 pm COMPARISON: None. HISTORY: ORDERING SYSTEM PROVIDED HISTORY: pain, swelling TECHNOLOGIST PROVIDED HISTORY: pain, swelling 59-year-old female with left ankle pain and swelling FINDINGS: Left ankle: Ankle mortise is intact. Osseous alignment is normal.  Mild degenerative changes of the midfoot and TMT joints. Moderate plantar calcaneal spur. Os trigonum variant. Calcification along the distal Achilles tendon. Small tibiotalar joint effusion. Mild soft tissue edema in the left leg and left ankle. No acute fracture or gross dislocation is seen. Boehler's angle is maintained. Left foot: Osseous alignment is normal.  Moderate degenerative change of the 1st MTP/MTS joints. Mild degenerative changes of the midfoot and TMT joints. Mild soft tissue edema of the left foot and left ankle. Small tibiotalar joint effusion. Moderate plantar calcaneal spur. Calcification along the distal Achilles tendon. No marginal erosions are identified. No acute fracture or gross dislocation is seen. The medial and middle cuneiforms demonstrate proper alignment with the base of the 1st and 2nd metatarsals respectively. Boehler's angle is maintained. Left ankle: 1. Mild degenerative changes as detailed above. Mild soft tissue edema in the left leg and left ankle. 2. No acute fracture or dislocation. 3. Small tibiotalar joint effusion. Left foot: 1. Mild soft tissue edema of the left foot and ankle. Mild degenerative changes as detailed above. Small tibiotalar joint effusion. 2. No acute fracture or dislocation. 3. Moderate plantar calcaneal spur. LABS:  Labs Reviewed   D-DIMER, QUANTITATIVE       All other labs were within normal range or not returned as of this dictation.     EMERGENCY DEPARTMENT COURSE and DIFFERENTIAL DIAGNOSIS/MDM:   Vitals:    Vitals: 12/20/21 1306   BP: (!) 105/91   Pulse: 77   Resp: 17   Temp: 97.7 °F (36.5 °C)   TempSrc: Oral   SpO2: 98%   Weight: 200 lb (90.7 kg)   Height: 5' 2\" (1.575 m)         Patient instructed to return to the emergency room if symptoms worsen, return, or any other concern right away which is agreed by the patient    ED MEDS:  Orders Placed This Encounter   Medications    HYDROcodone-acetaminophen (NORCO) 5-325 MG per tablet     Sig: Take 1 tablet by mouth every 6 hours as needed for Pain for up to 3 days. Intended supply: 3 days. Take lowest dose possible to manage pain     Dispense:  8 tablet     Refill:  0         CONSULTS:  None    PROCEDURES:  None      FINAL IMPRESSION      1. Left leg swelling          DISPOSITION/PLAN   DISPOSITION Decision To Discharge    PATIENT REFERRED TO:  [address] [place] [id number]  [address] [place] [address] ED  Margarito Noyola [id number] [phone]          DISCHARGE MEDICATIONS:  Discharge Medication List as of 12/20/2021  2:29 PM            Summation      Patient Course:    Left lower leg pain and swelling x several months. Intermittent. Worse when standing at work all day. Better with elevation. D-dimer slightly elevated. Venous doppler negative for a DVT. Imaging shows arthritis. No fractures or dislocations. No signs of gout, septic joint, cellulitis. Suspect swelling and pain for arthritis flare vs venous insufficiency. Recommend wearing compression stockings, elevating legs. Follow up with PCP. Discussed results and plan with the pt. They expressed appropriate understanding. Pt given close follow up, supportive care instructions and strict return instructions at the bedside. The care is provided during an unprecedented national emergency due to the novel coronavirus, COVID-19.       ED Medications administered this visit:  Medications - No data to display    New Prescriptions from this visit: Discharge Medication List as of 12/20/2021  2:29 PM          Follow-up:  [address] [place] [address] Nydia Stone 1997  [address] [place] [address]  [phone]            Final Impression:   1.  Left leg swelling               (Please note that portions of this note were completed with a voice recognition program )        Jenna Keene. Daniella 82, PA-C  12/20/21 6404

## 2021-12-20 NOTE — Clinical Note
Brandon Marshall was seen and treated in our emergency department on 12/20/2021. She may return to work on 12/23/2021. If you have any questions or concerns, please don't hesitate to call.       Malvin Esparza PA-C

## 2021-12-20 NOTE — ED PROVIDER NOTES
[address]  eMERGENCY dEPARTMENT eNCOUnter   Independent Attestation     Pt Name: Robby Herring  MRN: [id number]  [place] 1960  Date of evaluation: 12/20/21   Robby Herring is a 64 y.o. female who presents with Foot Pain and Leg Swelling    Vitals:   Vitals:    12/20/21 1306   BP: (!) 105/91   Pulse: 77   Resp: 17   Temp: 97.7 °F (36.5 °C)   TempSrc: Oral   SpO2: 98%   Weight: 200 lb (90.7 kg)   Height: 5' 2\" (1.575 m)     Impression:   1. Left leg swelling      I was personally available for consultation in the Emergency Department. I have reviewed the chart and agree with the documentation as recorded by the [facility], including the assessment, treatment plan and disposition.   Kaylie Godoy MD  Attending Emergency  Physician                  Kaylie Godoy MD  12/20/21 2181

## 2022-02-08 ENCOUNTER — OFFICE VISIT (OUTPATIENT)
Dept: OBGYN CLINIC | Age: 62
End: 2022-02-08
Payer: COMMERCIAL

## 2022-02-08 VITALS
BODY MASS INDEX: 38.09 KG/M2 | HEIGHT: 62 IN | WEIGHT: 207 LBS | DIASTOLIC BLOOD PRESSURE: 86 MMHG | SYSTOLIC BLOOD PRESSURE: 120 MMHG

## 2022-02-08 DIAGNOSIS — Z12.31 ENCOUNTER FOR SCREENING MAMMOGRAM FOR MALIGNANT NEOPLASM OF BREAST: ICD-10-CM

## 2022-02-08 DIAGNOSIS — Z01.419 WELL WOMAN EXAM WITH ROUTINE GYNECOLOGICAL EXAM: Primary | ICD-10-CM

## 2022-02-08 DIAGNOSIS — Z90.710 S/P TAH-BSO: ICD-10-CM

## 2022-02-08 DIAGNOSIS — Z90.722 S/P TAH-BSO: ICD-10-CM

## 2022-02-08 DIAGNOSIS — Z78.0 POSTMENOPAUSAL: ICD-10-CM

## 2022-02-08 DIAGNOSIS — Z90.79 S/P TAH-BSO: ICD-10-CM

## 2022-02-08 PROCEDURE — 99396 PREV VISIT EST AGE 40-64: CPT | Performed by: OBSTETRICS & GYNECOLOGY

## 2022-02-08 RX ORDER — PANTOPRAZOLE SODIUM 40 MG/1
TABLET, DELAYED RELEASE ORAL
COMMUNITY
Start: 2021-08-11

## 2022-02-08 RX ORDER — DIPHENHYDRAMINE HCL 25 MG
25 CAPSULE ORAL
COMMUNITY
Start: 2021-06-15

## 2022-02-08 NOTE — PROGRESS NOTES
Mother     Hypertension Sister      Social History     Socioeconomic History    Marital status:      Spouse name: Not on file    Number of children: Not on file    Years of education: Not on file    Highest education level: Not on file   Occupational History    Not on file   Tobacco Use    Smoking status: Former Smoker     Packs/day: 2.00     Years: 36.00     Pack years: 72.00     Types: Cigarettes     Start date: 1978     Quit date: 2017     Years since quittin.0    Smokeless tobacco: Never Used   Vaping Use    Vaping Use: Never used   Substance and Sexual Activity    Alcohol use: Yes     Alcohol/week: 0.0 standard drinks     Comment: occossional    Drug use: Never    Sexual activity: Not on file   Other Topics Concern    Not on file   Social History Narrative    Not on file     Social Determinants of Health     Financial Resource Strain:     Difficulty of Paying Living Expenses: Not on file   Food Insecurity:     Worried About Running Out of Food in the Last Year: Not on file    Megan of Food in the Last Year: Not on file   Transportation Needs:     Lack of Transportation (Medical): Not on file    Lack of Transportation (Non-Medical):  Not on file   Physical Activity:     Days of Exercise per Week: Not on file    Minutes of Exercise per Session: Not on file   Stress:     Feeling of Stress : Not on file   Social Connections:     Frequency of Communication with Friends and Family: Not on file    Frequency of Social Gatherings with Friends and Family: Not on file    Attends Hinduism Services: Not on file    Active Member of Clubs or Organizations: Not on file    Attends Club or Organization Meetings: Not on file    Marital Status: Not on file   Intimate Partner Violence:     Fear of Current or Ex-Partner: Not on file    Emotionally Abused: Not on file    Physically Abused: Not on file    Sexually Abused: Not on file   Housing Stability:     Unable to Pay for Housing in the Last Year: Not on file    Number of Places Lived in the Last Year: Not on file    Unstable Housing in the Last Year: Not on file       MEDICATIONS:  Current Outpatient Medications   Medication Sig Dispense Refill    pantoprazole (PROTONIX) 40 MG tablet   1 tab(s), PO, Daily, # 90 tab(s), 1 Refill(s), Pharmacy: [id number], TAKE 1 TABLET BY MOUTH EVERY DAY, 160.02, cm, 06/21/21 8:31:00 EDT, Height/Length Dosing, 85.7, kg, 06/21/21 8:31:00 EDT, Weight Dosing      diphenhydrAMINE (BENADRYL) 25 MG capsule 25 mg      acetaminophen (APAP EXTRA STRENGTH) 500 MG tablet Take 2 tablets by mouth every 6 hours as needed for Pain 30 tablet 3    ibuprofen (ADVIL;MOTRIN) 600 MG tablet Take 1 tablet by mouth every 6 hours as needed for Pain 30 tablet 0    Probiotic Product (PROBIOTIC-10 PO) Take 1 capsule by mouth daily (Patient not taking: Reported on 2/8/2022)       No current facility-administered medications for this visit. ALLERGIES:  Allergies as of 02/08/2022    (No Known Allergies)       Symptoms of decreased mood absent  Symptoms of anhedonia absent    **If either question is answered in a  positive fashion then complete the PHQ9 Scoring Evaluation and make the appropriate referral**      Immunization status: stated as current, but no records available. Gynecologic History:  Menarche: 15 yo  Menopause at 49 yo     Patient's last menstrual period was 07/04/2008 (exact date). Sexually Active: Yes    STD History: No     Permanent Sterilization: Yes hyst   Reversible Birth Control: No        Hormone Replacement Exposure: No      Genetic Qualified Family History of Breast, Ovarian , Colon or Uterine Cancer: No     If YES see scanned worksheet.     Preventative Health Testing:    Health Maintenance:  Health Maintenance Due   Topic Date Due    Hepatitis C screen  Never done    Depression Screen  Never done    HIV screen  Never done    DTaP/Tdap/Td vaccine (1 - Tdap) Never done   Juliette Arndt Diabetes screen  Never done    Lipid screen  Never done    Colon cancer screen colonoscopy  Never done    Shingles Vaccine (1 of 2) Never done    Low dose CT lung screening  Never done    COVID-19 Vaccine (3 - Booster) 08/04/2021    Flu vaccine (1) 09/01/2021       Date of Last Pap Smear: 1/2020 WNL neg  Abnormal Pap Smear History: na  Colposcopy History:   Date of Last Mammogram: 3/2021 WNL  Date of Last Colonoscopy: completed 6/2021 at [facility]. Per pt WNL  Date of Last Bone Density:      ________________________________________________________________________        REVIEW OF SYSTEMS:       A minimum of an eleven point review of systems was completed. Review Of Systems (11 point):  Constitutional: No fever, chills or malaise; No weight change or fatigue  Head and Eyes: No vision changes, Headache, Dizziness or trauma in last 12 months  ENT ROS: No hearing, Tinnitis, sinus or taste problems  Hematological and Lymphatic ROS:No Lymphoma, Von Willebrand's, Hemophillia or Bleeding History  Psych ROS: No Depression, Homicidal thoughts,suicidal thoughts, or anxiety  Breast ROS: No breast abnormalities or lumps  Respiratory ROS: No SOB, Pneumoniae,Cough, or Pulmonary Embolism   Cardiovascular ROS: No Chest Pain with Exertion, Palpitations, Syncope, Edema, Arrhythmia  Gastrointestinal ROS: No Indigestion, Heartburn, Nausea, vomiting, Diarrhea, Constipation,or Bowel Changes; No Bloody Stools or melena  Genito-Urinary ROS: No Dysuria, Hematuria or Nocturia.  No Urinary Incontinence or Vaginal Discharge  Musculoskeletal ROS: No Arthralgia, Arthritis,Gout,Osteoporosis or Rheumatism  Neurological ROS: No CVA, Migraines, Epilepsy, Seizure Hx, or Limb Weakness  Dermatological ROS: No Rash, Itching, Hives, Mole Changes or Cancer PHYSICAL Exam:     Constitutional:  Vitals:    02/08/22 1140   BP: 120/86   Site: Right Upper Arm   Position: Sitting   Cuff Size: Medium Adult   Weight: 207 lb (93.9 kg)   Height: 5' 2\" (1.575 m)       Chaperone for Intimate Exam   Chaperone was offered and accepted as part of the rooming process.  Chaperone: Jayne          General Appearance: This  is a well Developed, well Nourished, well groomed female. Her BMI was reviewed. Nutritional decision making was discussed. Skin:  There was a Normal Inspection of the skin without rashes or lesions. There were no rashes. (Papular, Maculopapular, Hives, Pustular, Macular)     There were no lesions (Ulcers, Erythema, Abn. Appearing Nevi)            Lymphatic:  No Lymph Nodes were Palpable in the neck , axilla or groin.  0 # Of Lymph Nodes; Location ; Character [Normal]  [Shotty] [Tender] [Enlarged]     Neck and EENT:  The neck was supple. There were no masses   The thyroid was not enlarged and had no masses. Perrla, EOMI B/L, TMI B/L No Abnormalities. Throat inspected-No exudates or Masses, Nares Patent No Masses        Respiratory: The lungs were auscultated and found to be clear. There were no rales, rhonchi or wheezes. There was a good respiratory effort. Cardiovascular: The heart was in a regular rate and rhythm. . No S3 or S4. There was no murmur appreciated. Location, grade, and radiation are not applicable. Extremities: The patients extremities were without calf tenderness, edema, or varicosities. There was full range of motion in all four extremities. Pulses in all four extremities were appreciated and are 2/4. Abdomen: The abdomen was soft and non-tender. There were good bowel sounds in all quadrants and there was no guarding, rebound or rigidity. On evaluation there was no evidence of hepatosplenomegaly and there was no costal vertebral landy tenderness bilaterally.   No hernias were appreciated. Abdominal Scars: yes    Psych: The patient had a normal Orientation to: Time, Place, Person, and Situation  There is no Mood / Affect changes    Breast:  (Chest)  normal appearance, no masses or tenderness, Inspection negative, No nipple retraction or dimpling, No nipple discharge or bleeding, No axillary or supraclavicular adenopathy, Normal to palpation without dominant masses, Taught monthly breast self examination  Self breast exams were reviewed in detail. Literature was given. Pelvic Exam:  External genitalia: hair loss and fat pad loss  Urinary system: urethral meatus normal and smooth nt bladder  Vaginal: atrophic mucosa; NO granulation tissue noted  Cervix: absent and removed surgically  Adnexa: non palpable and removed surgically  Uterus: absent and removed surgically    Rectal Exam:  exam declined by patient          Musculosk:  Normal Gait and station was noted. Digits were evaluated without abnormal findings. Range of motion, stability and strength were evaluated and found to be appropriate for the patients age. ASSESSMENT:      64 y.o. Annual   Diagnosis Orders   1. Well woman exam with routine gynecological exam     2. Postmenopausal     3. Encounter for screening mammogram for malignant neoplasm of breast  TORITO DIGITAL SCREEN W OR WO CAD BILATERAL   4. MALI-BSO Enterocele repair 6/22/2020            Chief Complaint   Patient presents with    Gynecologic Exam          Past Medical History:   Diagnosis Date    Seasonal allergies     Wears glasses          Patient Active Problem List    Diagnosis Date Noted    MALI-BSO Enterocele repair 6/22/2020 06/22/2020     Priority: High     Benign DZ      Overflow incontinence 01/14/2020     Priority: High    Postoperative state           Hereditary Breast, Ovarian, Colon and Uterine Cancer screening Done.           Tobacco & Secondary smoke risks reviewed; instructed on cessation and avoidance      Counseling Completed:  Preventative Health Recommendations and Follow up. PLAN:  Return in about 1 year (around 2/8/2023) for Annual.  Repeat Annual every 1 year  If Negative Cytology, Follow-up screening per current guidelines. Mammograms every 1 year. If 37 yo and last mammogram was negative. Calcium and Vitamin D dosing reviewed. Colonoscopy screening reviewed as well as onset for bone density testing. Barrier recommendations discussed. STD counseling and prevention reviewed. Routine health maintenance per patients PCP. Orders Placed This Encounter   Procedures    TORITO DIGITAL SCREEN W OR WO CAD BILATERAL     Standing Status:   Future     Standing Expiration Date:   4/7/2023     Order Specific Question:   Reason for exam:     Answer:   screening mammogram           The patient, Michelle Burger is a 64 y.o. female, was seen with a total time spent of 30 minutes for the visit on this date of service by the E/M provider. The time component had both face to face and non face to face time spent in determining the total time component. Counseling and education regarding her diagnosis listed below and her options regarding those diagnoses were also included in determining her time component. Diagnosis Orders   1. Well woman exam with routine gynecological exam     2. Postmenopausal     3. Encounter for screening mammogram for malignant neoplasm of breast  TORITO DIGITAL SCREEN W OR WO CAD BILATERAL   4. MALI-BSO Enterocele repair 6/22/2020          The patient had her preventative health maintenance recommendations and follow-up reviewed with her at the completion of her visit.

## 2022-03-28 ENCOUNTER — HOSPITAL ENCOUNTER (OUTPATIENT)
Dept: WOMENS IMAGING | Age: 62
Discharge: HOME | End: 2022-03-30
Payer: COMMERCIAL

## 2022-03-28 DIAGNOSIS — Z12.31 ENCOUNTER FOR SCREENING MAMMOGRAM FOR MALIGNANT NEOPLASM OF BREAST: ICD-10-CM

## 2022-03-28 PROCEDURE — 77063 BREAST TOMOSYNTHESIS BI: CPT

## 2024-11-28 ENCOUNTER — APPOINTMENT (OUTPATIENT)
Dept: CT IMAGING | Age: 64
End: 2024-11-28
Payer: COMMERCIAL

## 2024-11-28 ENCOUNTER — HOSPITAL ENCOUNTER (EMERGENCY)
Age: 64
Discharge: HOME | End: 2024-11-28
Attending: EMERGENCY MEDICINE
Payer: COMMERCIAL

## 2024-11-28 VITALS
HEIGHT: 62 IN | OXYGEN SATURATION: 96 % | RESPIRATION RATE: 18 BRPM | DIASTOLIC BLOOD PRESSURE: 75 MMHG | BODY MASS INDEX: 39.56 KG/M2 | WEIGHT: 215 LBS | HEART RATE: 75 BPM | TEMPERATURE: 98.3 F | SYSTOLIC BLOOD PRESSURE: 126 MMHG

## 2024-11-28 DIAGNOSIS — N30.01 ACUTE CYSTITIS WITH HEMATURIA: Primary | ICD-10-CM

## 2024-11-28 DIAGNOSIS — N17.9 AKI (ACUTE KIDNEY INJURY) (HCC): ICD-10-CM

## 2024-11-28 DIAGNOSIS — N10 ACUTE PYELONEPHRITIS: ICD-10-CM

## 2024-11-28 LAB
ALBUMIN SERPL-MCNC: 4.4 G/DL (ref 3.5–5.2)
ALP SERPL-CCNC: 111 U/L (ref 35–104)
ALT SERPL-CCNC: 15 U/L (ref 10–35)
ANION GAP SERPL CALCULATED.3IONS-SCNC: 10 MMOL/L (ref 9–16)
AST SERPL-CCNC: 18 U/L (ref 10–35)
BACTERIA URNS QL MICRO: ABNORMAL
BASOPHILS # BLD: 0 K/UL (ref 0–0.2)
BASOPHILS NFR BLD: 0 % (ref 0–2)
BILIRUB DIRECT SERPL-MCNC: 0.2 MG/DL (ref 0–0.3)
BILIRUB INDIRECT SERPL-MCNC: 0.3 MG/DL (ref 0–1)
BILIRUB SERPL-MCNC: 0.5 MG/DL (ref 0–1.2)
BILIRUB UR QL STRIP: NEGATIVE
BUN SERPL-MCNC: 26 MG/DL (ref 8–23)
CALCIUM SERPL-MCNC: 9.6 MG/DL (ref 8.6–10.4)
CASTS #/AREA URNS LPF: ABNORMAL /LPF
CASTS #/AREA URNS LPF: ABNORMAL /LPF
CHLORIDE SERPL-SCNC: 105 MMOL/L (ref 98–107)
CLARITY UR: ABNORMAL
CO2 SERPL-SCNC: 25 MMOL/L (ref 20–31)
COLOR UR: YELLOW
CREAT SERPL-MCNC: 1.2 MG/DL (ref 0.7–1.2)
EOSINOPHIL # BLD: 0 K/UL (ref 0–0.4)
EOSINOPHILS RELATIVE PERCENT: 0 % (ref 0–4)
EPI CELLS #/AREA URNS HPF: ABNORMAL /HPF
ERYTHROCYTE [DISTWIDTH] IN BLOOD BY AUTOMATED COUNT: 13.1 % (ref 11.5–14.9)
GFR, ESTIMATED: 51 ML/MIN/1.73M2
GLUCOSE SERPL-MCNC: 106 MG/DL (ref 74–99)
GLUCOSE UR STRIP-MCNC: NEGATIVE MG/DL
HCT VFR BLD AUTO: 38.4 % (ref 36–46)
HGB BLD-MCNC: 12.9 G/DL (ref 12–16)
HGB UR QL STRIP.AUTO: NEGATIVE
INR PPP: 0.9
KETONES UR STRIP-MCNC: ABNORMAL MG/DL
LEUKOCYTE ESTERASE UR QL STRIP: ABNORMAL
LIPASE SERPL-CCNC: 38 U/L (ref 13–60)
LYMPHOCYTES NFR BLD: 1.2 K/UL (ref 1–4.8)
LYMPHOCYTES RELATIVE PERCENT: 11 % (ref 24–44)
MCH RBC QN AUTO: 33 PG (ref 26–34)
MCHC RBC AUTO-ENTMCNC: 33.8 G/DL (ref 31–37)
MCV RBC AUTO: 97.7 FL (ref 80–100)
MONOCYTES NFR BLD: 1 K/UL (ref 0.1–1.3)
MONOCYTES NFR BLD: 9 % (ref 1–7)
NEUTROPHILS NFR BLD: 80 % (ref 36–66)
NEUTS SEG NFR BLD: 8.4 K/UL (ref 1.3–9.1)
NITRITE UR QL STRIP: POSITIVE
PH UR STRIP: 5.5 [PH] (ref 5–8)
PLATELET # BLD AUTO: 336 K/UL (ref 150–450)
PMV BLD AUTO: 7.3 FL (ref 6–12)
POTASSIUM SERPL-SCNC: 4.3 MMOL/L (ref 3.7–5.3)
PROT SERPL-MCNC: 7.3 G/DL (ref 6.6–8.7)
PROT UR STRIP-MCNC: ABNORMAL MG/DL
PROTHROMBIN TIME: 12.9 SEC (ref 11.8–14.6)
RBC # BLD AUTO: 3.93 M/UL (ref 4–5.2)
RBC #/AREA URNS HPF: ABNORMAL /HPF
SODIUM SERPL-SCNC: 140 MMOL/L (ref 136–145)
SP GR UR STRIP: 1.03 (ref 1–1.03)
UROBILINOGEN UR STRIP-ACNC: NORMAL EU/DL (ref 0–1)
WBC #/AREA URNS HPF: ABNORMAL /HPF
WBC OTHER # BLD: 10.6 K/UL (ref 3.5–11)

## 2024-11-28 PROCEDURE — 80076 HEPATIC FUNCTION PANEL: CPT

## 2024-11-28 PROCEDURE — 81001 URINALYSIS AUTO W/SCOPE: CPT

## 2024-11-28 PROCEDURE — 80048 BASIC METABOLIC PNL TOTAL CA: CPT

## 2024-11-28 PROCEDURE — 87184 SC STD DISK METHOD PER PLATE: CPT

## 2024-11-28 PROCEDURE — 2580000003 HC RX 258: Performed by: EMERGENCY MEDICINE

## 2024-11-28 PROCEDURE — 74176 CT ABD & PELVIS W/O CONTRAST: CPT

## 2024-11-28 PROCEDURE — 99284 EMERGENCY DEPT VISIT MOD MDM: CPT

## 2024-11-28 PROCEDURE — 87086 URINE CULTURE/COLONY COUNT: CPT

## 2024-11-28 PROCEDURE — 87186 SC STD MICRODIL/AGAR DIL: CPT

## 2024-11-28 PROCEDURE — 87077 CULTURE AEROBIC IDENTIFY: CPT

## 2024-11-28 PROCEDURE — 96374 THER/PROPH/DIAG INJ IV PUSH: CPT

## 2024-11-28 PROCEDURE — 36415 COLL VENOUS BLD VENIPUNCTURE: CPT

## 2024-11-28 PROCEDURE — 85610 PROTHROMBIN TIME: CPT

## 2024-11-28 PROCEDURE — 85025 COMPLETE CBC W/AUTO DIFF WBC: CPT

## 2024-11-28 PROCEDURE — 83690 ASSAY OF LIPASE: CPT

## 2024-11-28 PROCEDURE — 96375 TX/PRO/DX INJ NEW DRUG ADDON: CPT

## 2024-11-28 PROCEDURE — 6360000002 HC RX W HCPCS: Performed by: EMERGENCY MEDICINE

## 2024-11-28 RX ORDER — ACETAMINOPHEN 500 MG
500 TABLET ORAL 4 TIMES DAILY PRN
Qty: 30 TABLET | Refills: 0 | Status: SHIPPED | OUTPATIENT
Start: 2024-11-28

## 2024-11-28 RX ORDER — KETOROLAC TROMETHAMINE 30 MG/ML
15 INJECTION, SOLUTION INTRAMUSCULAR; INTRAVENOUS ONCE
Status: COMPLETED | OUTPATIENT
Start: 2024-11-28 | End: 2024-11-28

## 2024-11-28 RX ORDER — CEFPODOXIME PROXETIL 200 MG/1
200 TABLET, FILM COATED ORAL 2 TIMES DAILY
Qty: 20 TABLET | Refills: 0 | Status: SHIPPED | OUTPATIENT
Start: 2024-11-28 | End: 2024-12-08

## 2024-11-28 RX ADMIN — KETOROLAC TROMETHAMINE 15 MG: 30 INJECTION, SOLUTION INTRAMUSCULAR at 18:41

## 2024-11-28 RX ADMIN — CEFTRIAXONE SODIUM 1000 MG: 1 INJECTION, POWDER, FOR SOLUTION INTRAMUSCULAR; INTRAVENOUS at 19:37

## 2024-11-28 ASSESSMENT — ENCOUNTER SYMPTOMS
TROUBLE SWALLOWING: 0
BACK PAIN: 0
COLOR CHANGE: 0
SHORTNESS OF BREATH: 0
ABDOMINAL PAIN: 1
EYE PAIN: 0
VOMITING: 0
PHOTOPHOBIA: 0
FACIAL SWELLING: 0
CHEST TIGHTNESS: 0
VOICE CHANGE: 0
NAUSEA: 0

## 2024-11-28 ASSESSMENT — PAIN SCALES - GENERAL
PAINLEVEL_OUTOF10: 9
PAINLEVEL_OUTOF10: 8

## 2024-11-28 ASSESSMENT — PAIN DESCRIPTION - LOCATION: LOCATION: FLANK

## 2024-11-28 ASSESSMENT — LIFESTYLE VARIABLES
HOW MANY STANDARD DRINKS CONTAINING ALCOHOL DO YOU HAVE ON A TYPICAL DAY: PATIENT DOES NOT DRINK
HOW OFTEN DO YOU HAVE A DRINK CONTAINING ALCOHOL: NEVER

## 2024-11-28 ASSESSMENT — PAIN DESCRIPTION - ORIENTATION: ORIENTATION: LEFT

## 2024-11-28 ASSESSMENT — PAIN DESCRIPTION - DESCRIPTORS: DESCRIPTORS: ACHING

## 2024-11-28 NOTE — ED PROVIDER NOTES
EMERGENCY DEPARTMENT ENCOUNTER    Pt Name: Alexus Johansen  MRN: [id number]  Birthdate 1960  Date of evaluation: 11/28/24  CHIEF COMPLAINT       Chief Complaint   Patient presents with    Lower Back Pain     HISTORY OF PRESENT ILLNESS   64-year-old female presenting to the ER complaining of left-sided flank pain.  Patient states the pain started early in the morning today.    The history is provided by the patient.   Abdominal Pain  Pain location:  L flank  Pain quality: aching    Associated symptoms: no chest pain, no dysuria, no fatigue, no nausea, no shortness of breath and no vomiting            REVIEW OF SYSTEMS     Review of Systems   Constitutional:  Negative for activity change, appetite change and fatigue.   HENT:  Negative for facial swelling, trouble swallowing and voice change.    Eyes:  Negative for photophobia and pain.   Respiratory:  Negative for chest tightness and shortness of breath.    Cardiovascular:  Negative for chest pain and palpitations.   Gastrointestinal:  Positive for abdominal pain. Negative for nausea and vomiting.   Genitourinary:  Positive for flank pain (L). Negative for dysuria and urgency.   Musculoskeletal:  Negative for arthralgias and back pain.   Skin:  Negative for color change and rash.   Neurological:  Negative for dizziness, syncope and headaches.   Psychiatric/Behavioral:  Negative for behavioral problems and hallucinations.      PASTMEDICAL HISTORY     Past Medical History:   Diagnosis Date    Seasonal allergies     Wears glasses      Past Problem List  Patient Active Problem List   Diagnosis Code    Overflow incontinence N39.490    [facility]-BSO Enterocele repair 6/22/2020 Z90.710, Z90.722, Z90.79    Postoperative state Z98.890     SURGICAL HISTORY       Past Surgical History:   Procedure Laterality Date    CATARACT EXTRACTION W/  INTRAOCULAR LENS IMPLANT Bilateral 2009    HERNIA REPAIR  08/2009    Darryl fundiplication    HYSTERECTOMY      SKIN GRAFT  2007    TOTAL ABDOMINAL

## 2024-11-28 NOTE — ED TRIAGE NOTES
Mode of arrival (squad #, walk in, police, etc) : walk-in        Chief complaint(s): left back pain        Arrival Note (brief scenario, treatment PTA, etc).: Pt reports left back pain that started this morning AM        C= \"Have you ever felt that you should Cut down on your drinking?\"  No  A= \"Have people Annoyed you by criticizing your drinking?\"  No  G= \"Have you ever felt bad or Guilty about your drinking?\"  No  E= \"Have you ever had a drink as an Eye-opener first thing in the morning to steady your nerves or to help a hangover?\"  No      Deferred []      Reason for deferring: N/A    *If yes to two or more: probable alcohol abuse.*

## 2024-12-01 LAB
MICROORGANISM SPEC CULT: ABNORMAL
SPECIMEN DESCRIPTION: ABNORMAL

## (undated) DEVICE — SUTURE VCRL + SZ 2-0 L27IN ABSRB CLR CT-1 1/2 CIR TAPERCUT VCP259H

## (undated) DEVICE — PAD,NON-ADHERENT,3X8,STERILE,LF,1/PK: Brand: MEDLINE

## (undated) DEVICE — SPONGE LAP W18XL18IN WHT COT 4 PLY FLD STRUNG RADPQ DISP ST

## (undated) DEVICE — SYRINGE IRRIG 60ML SFT PLIABLE BLB EZ TO GRP 1 HND USE W/

## (undated) DEVICE — TOTAL TRAY, DB, 100% SILI FOLEY, 16FR 10: Brand: MEDLINE

## (undated) DEVICE — DRESSING TRNSPAR W4XL10IN FLM MIC POR SURESITE 123

## (undated) DEVICE — GOWN,SIRUS,NONRNF,SETINSLV,XL,20/CS: Brand: MEDLINE

## (undated) DEVICE — YANKAUER,BULB TIP,W/O VENT,RIGID,STERILE: Brand: MEDLINE

## (undated) DEVICE — STRIP,CLOSURE,WOUND,MEDI-STRIP,1/2X4: Brand: MEDLINE

## (undated) DEVICE — SUTURE COAT VCRL + LIGAPAK LIG REEL 54 IN SZ 0 UD BRAID VCP287G

## (undated) DEVICE — POUCH INSTR W6.75XL11.5IN FRST 2 PKT ADH FOR ORTH AND

## (undated) DEVICE — SOLUTION SCRB 4OZ 10% POVIDONE IOD ANTIMIC BTL

## (undated) DEVICE — MERCY HEALTH ST CHARLES: Brand: MEDLINE INDUSTRIES, INC.

## (undated) DEVICE — SINGLE PORT MANIFOLD: Brand: NEPTUNE 2

## (undated) DEVICE — CHLORAPREP 26ML ORANGE

## (undated) DEVICE — ST CHARLES MAJOR ABDOMINAL PK: Brand: MEDLINE INDUSTRIES, INC.

## (undated) DEVICE — SUTURE VCRL + SZ 3-0 L27IN ABSRB UD L26MM SH 1/2 CIR VCP416H

## (undated) DEVICE — GLOVE ORTHO 7   MSG9470

## (undated) DEVICE — PENCIL ES L3M BTTN SWCH HOLSTER W/ BLDE ELECTRD EDGE

## (undated) DEVICE — BLANKET WRM W29.9XL79.1IN UP BODY FORC AIR MISTRAL-AIR

## (undated) DEVICE — GLOVE SURG 7 PF POLYMER COAT WHT STRL SIGN LTX ESSENTIAL LTX

## (undated) DEVICE — SUTURE VCRL + SZ 2-0 L27IN ABSRB UD CT-2 L26MM 1/2 CIR TAPR VCP269H

## (undated) DEVICE — PAD,SANITARY,11 IN,MAXI,W/WINGS,N-STRL: Brand: MEDLINE

## (undated) DEVICE — BANDAGE,GAUZE,BULKEE II,4.5"X4.1YD,STRL: Brand: MEDLINE

## (undated) DEVICE — 3M™ STERI-STRIP™ COMPOUND BENZOIN TINCTURE 40 BAGS/CARTON 4 CARTONS/CASE C1544: Brand: 3M™ STERI-STRIP™

## (undated) DEVICE — GOWN,AURORA,NONREINFORCED,LARGE: Brand: MEDLINE

## (undated) DEVICE — INTENDED FOR TISSUE SEPARATION, AND OTHER PROCEDURES THAT REQUIRE A SHARP SURGICAL BLADE TO PUNCTURE OR CUT.: Brand: BARD-PARKER ® CARBON RIB-BACK BLADES

## (undated) DEVICE — SOLUTION PREP POVIDONE IOD FOR SKIN MUCOUS MEM PRIOR TO

## (undated) DEVICE — SUTURE VCRL + SZ 4-0 L27IN ABSRB WHT FS-2 3/8 CIR REV CUT VCP422H

## (undated) DEVICE — HYPODERMIC SAFETY NEEDLE: Brand: MAGELLAN

## (undated) DEVICE — Z DISCONTINUED BY MEDLINE USE 2711682 TRAY SKIN PREP DRY W/ PREM GLV

## (undated) DEVICE — GLOVE SURG SZ 8 L12IN FNGR THK75MIL WHT LTX POLYMER BEAD

## (undated) DEVICE — ST CHARLES PERI-GYN PACK: Brand: MEDLINE INDUSTRIES, INC.

## (undated) DEVICE — TOTAL TRAY, 16FR 10ML SIL FOLEY, URN: Brand: MEDLINE

## (undated) DEVICE — SUTURE VCRL SZ 0 L36IN ABSRB UD CT-1 L36MM 1/2 CIR TAPR PNT VCP946H

## (undated) DEVICE — UNDERPANTS MAT L XL SEAMLESS CLR CODE WAISTBAND KNIT

## (undated) DEVICE — SYRINGE 20ML LL S/C 50